# Patient Record
Sex: MALE | Race: OTHER | Employment: FULL TIME | URBAN - METROPOLITAN AREA
[De-identification: names, ages, dates, MRNs, and addresses within clinical notes are randomized per-mention and may not be internally consistent; named-entity substitution may affect disease eponyms.]

---

## 2017-10-11 ENCOUNTER — OFFICE VISIT (OUTPATIENT)
Dept: FAMILY MEDICINE CLINIC | Facility: CLINIC | Age: 37
End: 2017-10-11

## 2017-10-11 ENCOUNTER — LAB ENCOUNTER (OUTPATIENT)
Dept: LAB | Age: 37
End: 2017-10-11
Attending: FAMILY MEDICINE
Payer: COMMERCIAL

## 2017-10-11 VITALS
WEIGHT: 224 LBS | HEIGHT: 70.5 IN | SYSTOLIC BLOOD PRESSURE: 129 MMHG | BODY MASS INDEX: 31.71 KG/M2 | TEMPERATURE: 98 F | DIASTOLIC BLOOD PRESSURE: 89 MMHG | HEART RATE: 65 BPM

## 2017-10-11 DIAGNOSIS — R07.9 CHEST PAIN: Primary | ICD-10-CM

## 2017-10-11 DIAGNOSIS — R07.9 CHEST PAIN, UNSPECIFIED TYPE: ICD-10-CM

## 2017-10-11 DIAGNOSIS — K21.9 GASTROESOPHAGEAL REFLUX DISEASE WITHOUT ESOPHAGITIS: ICD-10-CM

## 2017-10-11 DIAGNOSIS — R07.9 CHEST PAIN, UNSPECIFIED TYPE: Primary | ICD-10-CM

## 2017-10-11 PROCEDURE — 36415 COLL VENOUS BLD VENIPUNCTURE: CPT

## 2017-10-11 PROCEDURE — 85025 COMPLETE CBC W/AUTO DIFF WBC: CPT

## 2017-10-11 PROCEDURE — 80061 LIPID PANEL: CPT

## 2017-10-11 PROCEDURE — 83013 H PYLORI (C-13) BREATH: CPT

## 2017-10-11 PROCEDURE — 99204 OFFICE O/P NEW MOD 45 MIN: CPT | Performed by: FAMILY MEDICINE

## 2017-10-11 PROCEDURE — 93005 ELECTROCARDIOGRAM TRACING: CPT | Performed by: FAMILY MEDICINE

## 2017-10-11 PROCEDURE — 93000 ELECTROCARDIOGRAM COMPLETE: CPT | Performed by: FAMILY MEDICINE

## 2017-10-11 PROCEDURE — 99212 OFFICE O/P EST SF 10 MIN: CPT | Performed by: FAMILY MEDICINE

## 2017-10-11 PROCEDURE — 80053 COMPREHEN METABOLIC PANEL: CPT

## 2017-10-11 RX ORDER — PANTOPRAZOLE SODIUM 40 MG/1
1 GRANULE, DELAYED RELEASE ORAL DAILY
Qty: 30 EACH | Refills: 2 | Status: SHIPPED | OUTPATIENT
Start: 2017-10-11 | End: 2017-10-24 | Stop reason: CLARIF

## 2017-10-11 NOTE — PROGRESS NOTES
10/11/2017  9:52 AM    Shane Watkins is a 40year old male. Chief complaint(s): Patient presents with:  Chest Pain: Patient c/o chest pain for the past 1 month. States that he feels pressure.    Abdominal Pain: abdominal bloating, gassy, and belching  Urin Gastrointestinal: Positive for heartburn. Negative for nausea, vomiting, abdominal pain, diarrhea and constipation. Musculoskeletal: Negative for back pain and neck pain. Skin: Negative for rash. Neurological: Negative for seizures and headaches. given include: Please, call our office with any questions or concerns. Notify Dr Zachary Lopez or the [facility] if there is a deterioration or worsening of the medical condition. Also, inform the doctor with any new symptoms or medications' side effects.

## 2017-10-13 ENCOUNTER — TELEPHONE (OUTPATIENT)
Dept: FAMILY MEDICINE CLINIC | Facility: CLINIC | Age: 37
End: 2017-10-13

## 2017-10-13 NOTE — TELEPHONE ENCOUNTER
----- Message from Cinda Roberto MD sent at 10/12/2017  6:24 PM CDT -----  Please call patient to set up a follow up appointment due to abnormal results.

## 2017-10-24 ENCOUNTER — OFFICE VISIT (OUTPATIENT)
Dept: FAMILY MEDICINE CLINIC | Facility: CLINIC | Age: 37
End: 2017-10-24

## 2017-10-24 VITALS
HEART RATE: 60 BPM | TEMPERATURE: 98 F | WEIGHT: 223 LBS | HEIGHT: 70.5 IN | SYSTOLIC BLOOD PRESSURE: 134 MMHG | DIASTOLIC BLOOD PRESSURE: 94 MMHG | BODY MASS INDEX: 31.57 KG/M2

## 2017-10-24 DIAGNOSIS — A04.8 H. PYLORI INFECTION: Primary | ICD-10-CM

## 2017-10-24 PROCEDURE — 99214 OFFICE O/P EST MOD 30 MIN: CPT | Performed by: FAMILY MEDICINE

## 2017-10-24 PROCEDURE — 99212 OFFICE O/P EST SF 10 MIN: CPT | Performed by: FAMILY MEDICINE

## 2017-10-24 RX ORDER — PANTOPRAZOLE SODIUM 40 MG/1
40 TABLET, DELAYED RELEASE ORAL
Qty: 90 TABLET | Refills: 1 | Status: SHIPPED | OUTPATIENT
Start: 2017-10-24 | End: 2018-01-15

## 2017-10-24 RX ORDER — CLARITHROMYCIN 500 MG/1
500 TABLET, COATED ORAL 2 TIMES DAILY
Qty: 28 TABLET | Refills: 0 | Status: SHIPPED | OUTPATIENT
Start: 2017-10-24 | End: 2017-11-07

## 2017-10-24 RX ORDER — PANTOPRAZOLE SODIUM 40 MG/1
TABLET, DELAYED RELEASE ORAL
COMMUNITY
Start: 2017-10-11 | End: 2017-10-24

## 2017-10-24 RX ORDER — ESOMEPRAZOLE MAGNESIUM 40 MG/1
40 CAPSULE, DELAYED RELEASE ORAL 2 TIMES DAILY
Qty: 28 CAPSULE | Refills: 0 | Status: SHIPPED | OUTPATIENT
Start: 2017-10-24 | End: 2017-11-07

## 2017-10-24 RX ORDER — AMOXICILLIN 500 MG/1
1000 CAPSULE ORAL 2 TIMES DAILY
Qty: 56 CAPSULE | Refills: 0 | Status: SHIPPED | OUTPATIENT
Start: 2017-10-24 | End: 2017-11-07

## 2017-10-24 NOTE — PROGRESS NOTES
10/24/2017  10:25 AM    Lisbeth Beach is a 40year old male. Chief complaint(s): Patient presents with:   Follow - Up: Patient here to f/u on his abdominal pain states that the medication did help alittle but pain is still present and anchilles pain has no capsule Rfl: 0   Esomeprazole Magnesium 40 MG Oral Capsule Delayed Release Take 1 capsule (40 mg total) by mouth 2 (two) times daily.  Disp: 28 capsule Rfl: 0       Allergies:    Seasonal                      ROS:   Review of Systems   Constitutional: Kathie Chairez Triglycerides 61 1 - 149 mg/dL   Non HDL Chol 120 <130 mg/dL   LDL Cholesterol 108 (H) 0 - 99 mg/dL   -COMP METABOLIC PANEL (14)   Result Value Ref Range   Glucose 114 (H) 70 - 99 mg/dL   Sodium 138 136 - 144 mmol/L   Potassium 4.2 3.3 - 5.1 mmol/L   Chl Sig: Take 1 tablet (500 mg total) by mouth 2 (two) times daily. amoxicillin 500 MG Oral Cap 56 capsule 0      Sig: Take 2 capsules (1,000 mg total) by mouth 2 (two) times daily.       Esomeprazole Magnesium 40 MG Oral Capsule Delayed Release 28 capsule

## 2017-11-09 ENCOUNTER — TELEPHONE (OUTPATIENT)
Dept: OTHER | Age: 37
End: 2017-11-09

## 2017-11-09 NOTE — TELEPHONE ENCOUNTER
Spoke with patient who gave verbal authorization for this nurse to speak with wife named Allie Booth.  Wife reports that patient was prescribed several medications for H.pylori and was suppose to take one each individually and then continue onto the next [facility]

## 2017-11-13 NOTE — TELEPHONE ENCOUNTER
Pt was inform of  message below and that he is to take the medication for 2 weeks and f/u in 4 months.  Pt verbalized understanding

## 2018-01-17 RX ORDER — PANTOPRAZOLE SODIUM 40 MG/1
40 TABLET, DELAYED RELEASE ORAL
Qty: 90 TABLET | Refills: 0 | Status: SHIPPED | OUTPATIENT
Start: 2018-01-17 | End: 2018-04-16

## 2018-01-24 ENCOUNTER — APPOINTMENT (OUTPATIENT)
Dept: GENERAL RADIOLOGY | Age: 38
End: 2018-01-24
Attending: NURSE PRACTITIONER
Payer: COMMERCIAL

## 2018-01-24 ENCOUNTER — HOSPITAL ENCOUNTER (OUTPATIENT)
Age: 38
Discharge: HOME | End: 2018-01-24
Payer: COMMERCIAL

## 2018-01-24 ENCOUNTER — NURSE TRIAGE (OUTPATIENT)
Dept: OTHER | Age: 38
End: 2018-01-24

## 2018-01-24 VITALS
BODY MASS INDEX: 30 KG/M2 | WEIGHT: 215 LBS | TEMPERATURE: 99 F | OXYGEN SATURATION: 97 % | RESPIRATION RATE: 20 BRPM | HEART RATE: 84 BPM | DIASTOLIC BLOOD PRESSURE: 85 MMHG | SYSTOLIC BLOOD PRESSURE: 127 MMHG

## 2018-01-24 DIAGNOSIS — J40 BRONCHITIS: Primary | ICD-10-CM

## 2018-01-24 PROCEDURE — 93005 ELECTROCARDIOGRAM TRACING: CPT

## 2018-01-24 PROCEDURE — 71046 X-RAY EXAM CHEST 2 VIEWS: CPT | Performed by: NURSE PRACTITIONER

## 2018-01-24 PROCEDURE — 94640 AIRWAY INHALATION TREATMENT: CPT

## 2018-01-24 PROCEDURE — 93010 ELECTROCARDIOGRAM REPORT: CPT

## 2018-01-24 PROCEDURE — 99214 OFFICE O/P EST MOD 30 MIN: CPT

## 2018-01-24 PROCEDURE — 93010 ELECTROCARDIOGRAM REPORT: CPT | Performed by: NURSE PRACTITIONER

## 2018-01-24 RX ORDER — IPRATROPIUM BROMIDE AND ALBUTEROL SULFATE 2.5; .5 MG/3ML; MG/3ML
3 SOLUTION RESPIRATORY (INHALATION) ONCE
Status: COMPLETED | OUTPATIENT
Start: 2018-01-24 | End: 2018-01-24

## 2018-01-24 RX ORDER — CODEINE PHOSPHATE AND GUAIFENESIN 10; 100 MG/5ML; MG/5ML
5 SOLUTION ORAL EVERY 6 HOURS PRN
Qty: 118 ML | Refills: 0 | Status: SHIPPED | OUTPATIENT
Start: 2018-01-24 | End: 2018-01-29

## 2018-01-24 RX ORDER — BENZONATATE 100 MG/1
100 CAPSULE ORAL 3 TIMES DAILY PRN
Qty: 30 CAPSULE | Refills: 0 | Status: SHIPPED | OUTPATIENT
Start: 2018-01-24 | End: 2018-02-06 | Stop reason: ALTCHOICE

## 2018-01-24 RX ORDER — ALBUTEROL SULFATE 90 UG/1
2 AEROSOL, METERED RESPIRATORY (INHALATION) EVERY 4 HOURS PRN
Qty: 1 INHALER | Refills: 0 | Status: SHIPPED | OUTPATIENT
Start: 2018-01-24 | End: 2018-02-06 | Stop reason: ALTCHOICE

## 2018-01-24 RX ORDER — PREDNISONE 20 MG/1
40 TABLET ORAL DAILY
Qty: 8 TABLET | Refills: 0 | Status: SHIPPED | OUTPATIENT
Start: 2018-01-24 | End: 2018-01-28

## 2018-01-24 RX ORDER — PREDNISONE 20 MG/1
40 TABLET ORAL ONCE
Status: COMPLETED | OUTPATIENT
Start: 2018-01-24 | End: 2018-01-24

## 2018-01-24 NOTE — ED INITIAL ASSESSMENT (HPI)
C/o coughing for 3 days with chest pain and tightness for 2 months Saw PMD dx with Anibal.  No fever

## 2018-01-24 NOTE — TELEPHONE ENCOUNTER
Action Requested: Summary for Provider     []  Critical Lab, Recommendations Needed  [] Need Additional Advice  [x]   FYI    []   Need Orders  [] Need Medications Sent to Pharmacy  []  Other     SUMMARY: Advised ER per protocol (d/t chest pain even when

## 2018-01-24 NOTE — ED PROVIDER NOTES
Patient presents with:  Cough/URI      HPI:     Rand Johnson is a 40year old male with a past history of H. pylori presents with coughing, chest pain and tightness since October.   Patient report he was treated for H. pylori by his primary care provider and bilaterally    MDM/Assessment/Plan:   Orders for this encounter:  Duoneb, EKG, prednisone, CXR and re-evaluate.      SPO2 97% on room air which is normal.    Xr Chest Pa + Lat Chest (cpt=71046)    Result Date: 1/24/2018  CONCLUSION: No acute cardiopulmonary cough.          Dispense:  118 mL          Refill:  0    Labs performed this visit:  No results found for this or any previous visit (from the past 10 hour(s)). Diagnosis:    ICD-10-CM    1.  Bronchitis J40        All results reviewed and discussed with

## 2018-02-06 ENCOUNTER — OFFICE VISIT (OUTPATIENT)
Dept: FAMILY MEDICINE CLINIC | Facility: CLINIC | Age: 38
End: 2018-02-06

## 2018-02-06 ENCOUNTER — LAB ENCOUNTER (OUTPATIENT)
Dept: LAB | Age: 38
End: 2018-02-06
Attending: FAMILY MEDICINE
Payer: COMMERCIAL

## 2018-02-06 VITALS
SYSTOLIC BLOOD PRESSURE: 130 MMHG | WEIGHT: 224 LBS | TEMPERATURE: 98 F | DIASTOLIC BLOOD PRESSURE: 94 MMHG | HEART RATE: 72 BPM | BODY MASS INDEX: 31.71 KG/M2 | HEIGHT: 70.5 IN

## 2018-02-06 DIAGNOSIS — K21.9 ESOPHAGEAL REFLUX: ICD-10-CM

## 2018-02-06 DIAGNOSIS — A04.8 H. PYLORI INFECTION: Primary | ICD-10-CM

## 2018-02-06 DIAGNOSIS — M94.0 COSTOCHONDRITIS: ICD-10-CM

## 2018-02-06 DIAGNOSIS — K21.9 GASTROESOPHAGEAL REFLUX DISEASE WITHOUT ESOPHAGITIS: ICD-10-CM

## 2018-02-06 PROCEDURE — 83013 H PYLORI (C-13) BREATH: CPT

## 2018-02-06 PROCEDURE — 99214 OFFICE O/P EST MOD 30 MIN: CPT | Performed by: FAMILY MEDICINE

## 2018-02-06 PROCEDURE — 99212 OFFICE O/P EST SF 10 MIN: CPT | Performed by: FAMILY MEDICINE

## 2018-02-06 NOTE — PROGRESS NOTES
2/6/2018  11:42 AM    Deedee Raphael is a 40year old male. Chief complaint(s): Patient presents with: Follow - Up: Patient was seen in urgent care for Bronchitis and f/u on H Pylori    HPI:     Deedee Raphael primary complaint is regarding GERD/ H. pylori. Respiratory: Negative for cough, shortness of breath and wheezing. Cardiovascular: Positive for chest pain. Negative for palpitations. Gastrointestinal: Positive for heartburn.  Negative for nausea, vomiting, abdominal pain, diarrhea and constipation Dictated by (CST): Rocio Roblero MD on 1/24/2018 at 10:23     Approved by (CST): Rocio Roblero MD on 1/24/2018 at 10:24          CONCLUSION: No acute cardiopulmonary pathology.             ASSESSMENT/PLAN:   Assessment   H. pylori infection  (pr

## 2018-02-08 LAB — H. PYLORI BREATH TEST: NEGATIVE

## 2018-03-01 ENCOUNTER — LAB ENCOUNTER (OUTPATIENT)
Dept: LAB | Age: 38
End: 2018-03-01
Attending: FAMILY MEDICINE
Payer: COMMERCIAL

## 2018-03-01 ENCOUNTER — OFFICE VISIT (OUTPATIENT)
Dept: FAMILY MEDICINE CLINIC | Facility: CLINIC | Age: 38
End: 2018-03-01

## 2018-03-01 VITALS
HEART RATE: 73 BPM | SYSTOLIC BLOOD PRESSURE: 131 MMHG | WEIGHT: 226 LBS | TEMPERATURE: 99 F | BODY MASS INDEX: 31.99 KG/M2 | DIASTOLIC BLOOD PRESSURE: 90 MMHG | HEIGHT: 70.5 IN

## 2018-03-01 DIAGNOSIS — Z00.00 PHYSICAL EXAM: Primary | ICD-10-CM

## 2018-03-01 DIAGNOSIS — Z00.00 PHYSICAL EXAM: ICD-10-CM

## 2018-03-01 LAB
ALBUMIN SERPL BCP-MCNC: 4.1 G/DL (ref 3.5–4.8)
ALBUMIN/GLOB SERPL: 2 {RATIO} (ref 1–2)
ALP SERPL-CCNC: 52 U/L (ref 32–100)
ALT SERPL-CCNC: 66 U/L (ref 17–63)
ANION GAP SERPL CALC-SCNC: 6 MMOL/L (ref 0–18)
AST SERPL-CCNC: 42 U/L (ref 15–41)
BACTERIA UR QL AUTO: NEGATIVE /HPF
BASOPHILS # BLD: 0.1 K/UL (ref 0–0.2)
BASOPHILS NFR BLD: 1 %
BILIRUB SERPL-MCNC: 0.7 MG/DL (ref 0.3–1.2)
BILIRUB UR QL: NEGATIVE
BUN SERPL-MCNC: 12 MG/DL (ref 8–20)
BUN/CREAT SERPL: 13.8 (ref 10–20)
CALCIUM SERPL-MCNC: 8.9 MG/DL (ref 8.5–10.5)
CHLORIDE SERPL-SCNC: 107 MMOL/L (ref 95–110)
CHOLEST SERPL-MCNC: 178 MG/DL (ref 110–200)
CLARITY UR: CLEAR
CO2 SERPL-SCNC: 26 MMOL/L (ref 22–32)
COLOR UR: YELLOW
CREAT SERPL-MCNC: 0.87 MG/DL (ref 0.5–1.5)
EOSINOPHIL # BLD: 0.2 K/UL (ref 0–0.7)
EOSINOPHIL NFR BLD: 2 %
ERYTHROCYTE [DISTWIDTH] IN BLOOD BY AUTOMATED COUNT: 13.7 % (ref 11–15)
GLOBULIN PLAS-MCNC: 2.1 G/DL (ref 2.5–3.7)
GLUCOSE SERPL-MCNC: 128 MG/DL (ref 70–99)
GLUCOSE UR-MCNC: NEGATIVE MG/DL
HBA1C MFR BLD: 6 % (ref 4–6)
HCT VFR BLD AUTO: 42.4 % (ref 41–52)
HDLC SERPL-MCNC: 46 MG/DL
HGB BLD-MCNC: 14.3 G/DL (ref 13.5–17.5)
HGB UR QL STRIP.AUTO: NEGATIVE
KETONES UR-MCNC: NEGATIVE MG/DL
LDLC SERPL CALC-MCNC: 112 MG/DL (ref 0–99)
LEUKOCYTE ESTERASE UR QL STRIP.AUTO: NEGATIVE
LYMPHOCYTES # BLD: 1.9 K/UL (ref 1–4)
LYMPHOCYTES NFR BLD: 28 %
MCH RBC QN AUTO: 28.7 PG (ref 27–32)
MCHC RBC AUTO-ENTMCNC: 33.7 G/DL (ref 32–37)
MCV RBC AUTO: 85.1 FL (ref 80–100)
MONOCYTES # BLD: 0.5 K/UL (ref 0–1)
MONOCYTES NFR BLD: 7 %
NEUTROPHILS # BLD AUTO: 4.3 K/UL (ref 1.8–7.7)
NEUTROPHILS NFR BLD: 63 %
NITRITE UR QL STRIP.AUTO: NEGATIVE
NONHDLC SERPL-MCNC: 132 MG/DL
OSMOLALITY UR CALC.SUM OF ELEC: 289 MOSM/KG (ref 275–295)
PATIENT FASTING: YES
PH UR: 6 [PH] (ref 5–8)
PLATELET # BLD AUTO: 233 K/UL (ref 140–400)
PMV BLD AUTO: 8.6 FL (ref 7.4–10.3)
POTASSIUM SERPL-SCNC: 3.9 MMOL/L (ref 3.3–5.1)
PROT SERPL-MCNC: 6.2 G/DL (ref 5.9–8.4)
PROT UR-MCNC: NEGATIVE MG/DL
RBC # BLD AUTO: 4.97 M/UL (ref 4.5–5.9)
RBC #/AREA URNS AUTO: 1 /HPF
SODIUM SERPL-SCNC: 139 MMOL/L (ref 136–144)
SP GR UR STRIP: 1.03 (ref 1–1.03)
TRIGL SERPL-MCNC: 100 MG/DL (ref 1–149)
TSH SERPL-ACNC: 1.27 UIU/ML (ref 0.45–5.33)
UROBILINOGEN UR STRIP-ACNC: <2
VIT C UR-MCNC: NEGATIVE MG/DL
WBC # BLD AUTO: 7 K/UL (ref 4–11)
WBC #/AREA URNS AUTO: 1 /HPF

## 2018-03-01 PROCEDURE — 90471 IMMUNIZATION ADMIN: CPT | Performed by: FAMILY MEDICINE

## 2018-03-01 PROCEDURE — 81001 URINALYSIS AUTO W/SCOPE: CPT | Performed by: FAMILY MEDICINE

## 2018-03-01 PROCEDURE — 36415 COLL VENOUS BLD VENIPUNCTURE: CPT

## 2018-03-01 PROCEDURE — 90715 TDAP VACCINE 7 YRS/> IM: CPT | Performed by: FAMILY MEDICINE

## 2018-03-01 PROCEDURE — 99395 PREV VISIT EST AGE 18-39: CPT | Performed by: FAMILY MEDICINE

## 2018-03-01 PROCEDURE — 81015 MICROSCOPIC EXAM OF URINE: CPT | Performed by: FAMILY MEDICINE

## 2018-03-01 PROCEDURE — 82306 VITAMIN D 25 HYDROXY: CPT | Performed by: FAMILY MEDICINE

## 2018-03-01 PROCEDURE — 80061 LIPID PANEL: CPT

## 2018-03-01 PROCEDURE — 84443 ASSAY THYROID STIM HORMONE: CPT

## 2018-03-01 PROCEDURE — 83036 HEMOGLOBIN GLYCOSYLATED A1C: CPT

## 2018-03-01 PROCEDURE — 80053 COMPREHEN METABOLIC PANEL: CPT

## 2018-03-01 PROCEDURE — 93005 ELECTROCARDIOGRAM TRACING: CPT

## 2018-03-01 PROCEDURE — 93010 ELECTROCARDIOGRAM REPORT: CPT | Performed by: FAMILY MEDICINE

## 2018-03-01 PROCEDURE — 85025 COMPLETE CBC W/AUTO DIFF WBC: CPT

## 2018-03-01 NOTE — PROGRESS NOTES
3/1/2018  10:00 AM    Makenna Casillas is a 40year old male. Chief complaint(s): Patient presents with:  Routine Physical    HPI:     Makenna Casillas primary complaint is regarding CPE.      Makenna Casillas is a 40year old male is here for routine periodic health s intolerance, polydipsia and polyuria. Genitourinary: Negative for bladder incontinence, urgency, frequency, hematuria, decreased urine volume, discharge, difficulty urinating, genital sores, testicular pain, nocturia and sexual dysfunction.    Musculoskel and no tenderness. Musculoskeletal:   Spinal exam without scoliosis. Lymphadenopathy:   Negative for cervical, axillary or inguinal lymphadenopathy. There are no lower extremities edema or varicose veins. Neurological: No sensory deficit.    Reflex staying active; attempt to keep a schedule that includes an adequate sleep and physical exercise / activities Patient educated on doing regular self testicular exam. Patient was educated on sexual transmitted disease.  Best to abstain from sexual intercours

## 2018-03-02 LAB — 25(OH)D3 SERPL-MCNC: 22.8 NG/ML

## 2018-03-03 RX ORDER — ERGOCALCIFEROL 1.25 MG/1
50000 CAPSULE ORAL WEEKLY
Qty: 12 CAPSULE | Refills: 4 | Status: SHIPPED | OUTPATIENT
Start: 2018-03-03 | End: 2018-04-02

## 2018-04-16 RX ORDER — PANTOPRAZOLE SODIUM 40 MG/1
40 TABLET, DELAYED RELEASE ORAL
Qty: 90 TABLET | Refills: 0 | Status: SHIPPED | OUTPATIENT
Start: 2018-04-16 | End: 2018-09-27

## 2018-05-14 ENCOUNTER — OFFICE VISIT (OUTPATIENT)
Dept: FAMILY MEDICINE CLINIC | Facility: CLINIC | Age: 38
End: 2018-05-14

## 2018-05-14 VITALS
TEMPERATURE: 99 F | BODY MASS INDEX: 31 KG/M2 | DIASTOLIC BLOOD PRESSURE: 85 MMHG | HEART RATE: 73 BPM | WEIGHT: 222 LBS | SYSTOLIC BLOOD PRESSURE: 126 MMHG

## 2018-05-14 DIAGNOSIS — F41.9 ANXIETY: ICD-10-CM

## 2018-05-14 DIAGNOSIS — R35.89 POLYURIA: Primary | ICD-10-CM

## 2018-05-14 PROCEDURE — 99212 OFFICE O/P EST SF 10 MIN: CPT | Performed by: FAMILY MEDICINE

## 2018-05-14 PROCEDURE — 99214 OFFICE O/P EST MOD 30 MIN: CPT | Performed by: FAMILY MEDICINE

## 2018-05-14 RX ORDER — ALPRAZOLAM 0.25 MG/1
0.25 TABLET ORAL NIGHTLY PRN
Qty: 30 TABLET | Refills: 0 | Status: SHIPPED | OUTPATIENT
Start: 2018-05-14 | End: 2018-06-05

## 2018-05-14 NOTE — PROGRESS NOTES
5/14/2018  1:54 PM    Amelie Fiore is a 45year old male.     Chief complaint(s): Patient presents with:  Urinary Frequency: c/o frequent urination  Anxiety: pt has been very stressed lately and feeling chest pain/ pressure    HPI:     Amelie Fiore primary co (Active prior to today's visit):    Current Outpatient Prescriptions:  ALPRAZolam 0.25 MG Oral Tab Take 1 tablet (0.25 mg total) by mouth nightly as needed for Sleep.  Disp: 30 tablet Rfl: 0   PANTOPRAZOLE SODIUM 40 MG Oral Tab EC TAKE 1 TABLET (40 MG TOTAL penis normal.   Skin: No rash noted. Psychiatric: His mood appears anxious. Appropriate affect and demeanor.        LABORATORY RESULTS:   No results found for: Gene Moons     Results for orders placed or performe indicated    -CBC W/ DIFFERENTIAL   Result Value Ref Range   WBC 7.0 4.0 - 11.0 K/UL   RBC 4.97 4.50 - 5.90 M/UL   HGB 14.3 13.5 - 17.5 g/dL   HCT 42.4 41.0 - 52.0 %   MCV 85.1 80.0 - 100.0 fL   MCH 28.7 27.0 - 32.0 pg   MCHC 33.7 32.0 - 37.0 g/dl   RDW 13

## 2018-06-05 NOTE — TELEPHONE ENCOUNTER
From: Cheryl Ganser  Sent: 6/5/2018 1:04 PM CDT  Subject: Medication Renewal Request    Cheryl Ganser would like a refill of the following medications:     ALPRAZolam 0.25 MG Oral Tab Ana Walter MD]    Preferred pharmacy: [facility][id number] Ricco Obrien,

## 2018-06-06 ENCOUNTER — PATIENT MESSAGE (OUTPATIENT)
Dept: FAMILY MEDICINE CLINIC | Facility: CLINIC | Age: 38
End: 2018-06-06

## 2018-06-06 RX ORDER — ALPRAZOLAM 0.25 MG/1
0.25 TABLET ORAL NIGHTLY PRN
Qty: 30 TABLET | Refills: 0
Start: 2018-06-06 | End: 2018-06-06

## 2018-06-06 RX ORDER — ALPRAZOLAM 0.25 MG/1
0.25 TABLET ORAL NIGHTLY PRN
Qty: 30 TABLET | Refills: 0 | Status: SHIPPED | OUTPATIENT
Start: 2018-06-06 | End: 2018-09-27

## 2018-06-06 NOTE — TELEPHONE ENCOUNTER
From: Divina Rodriguez  To: Wilfred Cartagena MD  Sent: 6/6/2018 9:59 AM CDT  Subject: Prescription Question    I just had a prescription re fill approved by dr Og Oscar .can my wife  the paperwork at the office ?

## 2018-06-06 NOTE — TELEPHONE ENCOUNTER
LOV: 5-14-18 Last Rx: 5-14-18    Please advise in regards to refill request. Thank You    Please respond to pool: ADELA ANO LPN/TONY

## 2018-09-27 ENCOUNTER — OFFICE VISIT (OUTPATIENT)
Dept: FAMILY MEDICINE CLINIC | Facility: CLINIC | Age: 38
End: 2018-09-27
Payer: COMMERCIAL

## 2018-09-27 VITALS
WEIGHT: 228 LBS | HEART RATE: 63 BPM | TEMPERATURE: 98 F | SYSTOLIC BLOOD PRESSURE: 135 MMHG | BODY MASS INDEX: 32 KG/M2 | DIASTOLIC BLOOD PRESSURE: 92 MMHG

## 2018-09-27 DIAGNOSIS — B34.9 ACUTE VIRAL SYNDROME: Primary | ICD-10-CM

## 2018-09-27 PROCEDURE — 99212 OFFICE O/P EST SF 10 MIN: CPT | Performed by: FAMILY MEDICINE

## 2018-09-27 PROCEDURE — 99213 OFFICE O/P EST LOW 20 MIN: CPT | Performed by: FAMILY MEDICINE

## 2018-09-27 RX ORDER — FLUTICASONE PROPIONATE 50 MCG
2 SPRAY, SUSPENSION (ML) NASAL DAILY
Qty: 1 INHALER | Refills: 3 | Status: SHIPPED | OUTPATIENT
Start: 2018-09-27 | End: 2019-11-07 | Stop reason: ALTCHOICE

## 2018-09-27 NOTE — PROGRESS NOTES
2018 10:35 AM    Roger Sterling, : 1980  Patient presents with:  Sinus Problem: C/O sinus pressure, congestion, fatigue, sneezing and itchy throat.     HPI:     Roger Sterling is a 45year old male who presents for evaluation of a chief complaint of Number of children: Not on file      Years of education: Not on file      Highest education level: Not on file    Social Needs      Financial resource strain: Not on file      Food insecurity - worry: Not on file      Food insecurity - inability: Not on fi hour(s)). MDM/Assessment/Plan:   Assessment and Plan:    Diagnosis:    ICD-10-CM    1. Acute viral syndrome B34.9        MEDICATIONS: •  Pseudoephedrine-Ibuprofen  MG Oral Tab, Take 1 tablet by mouth every 6 (six) hours as needed.  Take 1 tablet po

## 2018-10-30 ENCOUNTER — PATIENT MESSAGE (OUTPATIENT)
Dept: FAMILY MEDICINE CLINIC | Facility: CLINIC | Age: 38
End: 2018-10-30

## 2018-10-30 RX ORDER — ALPRAZOLAM 0.25 MG/1
0.25 TABLET ORAL NIGHTLY PRN
Qty: 30 TABLET | Refills: 0 | Status: SHIPPED
Start: 2018-10-30 | End: 2019-09-19

## 2018-10-30 NOTE — TELEPHONE ENCOUNTER
From: Andrés Cordero  To: Fior Nye MD  Sent: 10/30/2018 11:46 AM CDT  Subject: Prescription Question    Hello,    I’m sending a message Requesting a refill of alpazolam 0.25 mg.    I had been prescribed this medication as needed for sleep.  [employer]

## 2018-10-30 NOTE — TELEPHONE ENCOUNTER
Requested Prescriptions     Pending Prescriptions Disp Refills   • ALPRAZolam 0.25 MG Oral Tab 30 tablet 0     Sig: Take 1 tablet (0.25 mg total) by mouth nightly as needed for Sleep.        Last Office Visit with PCP: 9/27/2018  Last Blood Pressures:  BP R

## 2018-10-30 NOTE — TELEPHONE ENCOUNTER
From: Kalpana Mention  To: Sue Pretty MD  Sent: 10/30/2018 11:46 AM CDT  Subject: Prescription Question    Hello,  I’m sending a message Requesting  a refill of alpazolam 0.25 mg.  I had been prescribed this medication  as needed for sleep.  [employer]

## 2018-10-31 RX ORDER — ALPRAZOLAM 0.25 MG/1
TABLET ORAL
Qty: 30 TABLET | Refills: 0 | OUTPATIENT
Start: 2018-10-31

## 2019-09-19 ENCOUNTER — OFFICE VISIT (OUTPATIENT)
Dept: FAMILY MEDICINE CLINIC | Facility: CLINIC | Age: 39
End: 2019-09-19
Payer: COMMERCIAL

## 2019-09-19 VITALS
BODY MASS INDEX: 30.16 KG/M2 | HEART RATE: 65 BPM | DIASTOLIC BLOOD PRESSURE: 90 MMHG | SYSTOLIC BLOOD PRESSURE: 132 MMHG | HEIGHT: 70.5 IN | WEIGHT: 213 LBS | TEMPERATURE: 98 F

## 2019-09-19 DIAGNOSIS — B34.9 ACUTE VIRAL SYNDROME: Primary | ICD-10-CM

## 2019-09-19 DIAGNOSIS — J02.9 SORE THROAT: ICD-10-CM

## 2019-09-19 LAB
CONTROL LINE PRESENT WITH A CLEAR BACKGROUND (YES/NO): YES YES/NO
KIT EXPIRATION DATE: NORMAL DATE
KIT LOT #: NORMAL NUMERIC
STREP GRP A CUL-SCR: NEGATIVE

## 2019-09-19 PROCEDURE — 99213 OFFICE O/P EST LOW 20 MIN: CPT | Performed by: FAMILY MEDICINE

## 2019-09-19 PROCEDURE — 87880 STREP A ASSAY W/OPTIC: CPT | Performed by: FAMILY MEDICINE

## 2019-09-19 RX ORDER — ECHINACEA PURPUREA EXTRACT 125 MG
1 TABLET ORAL 4 TIMES DAILY PRN
Qty: 50 ML | Refills: 1 | Status: SHIPPED | OUTPATIENT
Start: 2019-09-19 | End: 2019-11-07 | Stop reason: ALTCHOICE

## 2019-09-19 RX ORDER — ALPRAZOLAM 0.25 MG/1
0.25 TABLET ORAL NIGHTLY PRN
Qty: 30 TABLET | Refills: 0 | Status: SHIPPED | OUTPATIENT
Start: 2019-09-19 | End: 2019-11-07

## 2019-09-19 NOTE — PROGRESS NOTES
2019 11:54 AM    Ruddy Sahu, : 1980  Patient presents with:  Flu: congestion, sore throat, slighly fever, mostly dry cough but sometimes yellow or green phealm x 4 days    HPI:     Ruddy Sahu is a 44year old male who presents for evaluation file.    Social History: Social History    Socioeconomic History      Marital status:       Spouse name: Not on file      Number of children: Not on file      Years of education: Not on file      Highest education level: Not on file    Occupational Physical Exam:     Physical Examination:    /90   Pulse 65   Temp 97.8 °F (36.6 °C)   Ht 5' 10.5\" (1.791 m)   Wt 213 lb (96.6 kg)   BMI 30.13 kg/m²     GENERAL: well developed, well nourished, well hydrated, no distress  SKIN: good skin turgor, given include: Please, call our office with any questions or concerns. Notify the doctor if there is a deterioration or worsening of the medical condition. Also, inform the doctor with any new symptoms or medications' side effects. .    FOLLOW-UP:  Instruct

## 2019-11-07 NOTE — PROGRESS NOTES
11/7/2019  2:01 PM    Roger Sterling is a 44year old male. Chief complaint(s): Patient presents with:   Insomnia: recently lost his father, is staying at his mothers which is a little difficult     HPI:     Roger Sterling primary complaint is regarding as abo for sleep disturbance. Negative for depressed mood. The patient is nervous/anxious. PHYSICAL EXAM:   Physical Exam    Constitutional: He appears well-developed and well-nourished.    /88 (BP Location: Right arm, Patient Position: Sitting, Cuff

## 2020-01-02 NOTE — PROGRESS NOTES
1/2/2020  1:43 PM    Jackie Powell is a 44year old male. Chief complaint(s): Patient presents with: Anxiety: follow up/medication refill  Insomnia: follow up/medication refill    HPI:     Jackie Powell primary complaint is regarding as above.      Rajeev Heath Negative for headaches. Psychiatric/Behavioral: Positive for sleep disturbance. Negative for depressed mood. The patient is nervous/anxious. PHYSICAL EXAM:   Physical Exam    Constitutional: He appears well-developed and well-nourished.    /80

## 2020-11-25 ENCOUNTER — TELEMEDICINE (OUTPATIENT)
Dept: FAMILY MEDICINE CLINIC | Facility: CLINIC | Age: 40
End: 2020-11-25

## 2020-11-25 DIAGNOSIS — B34.9 ACUTE VIRAL SYNDROME: Primary | ICD-10-CM

## 2020-11-25 DIAGNOSIS — Z20.822 EXPOSURE TO COVID-19 VIRUS: ICD-10-CM

## 2020-11-25 PROCEDURE — 99213 OFFICE O/P EST LOW 20 MIN: CPT | Performed by: FAMILY MEDICINE

## 2020-11-25 NOTE — PROGRESS NOTES
Virtual Video Check-In    Darlin Mccarty verbally consents to a Virtual/Video Check-In visit on 11/25/20. Patient has been referred to the [facility] website at [email or web address] to review the yearly Consent to Treat document.     Patient understands and acc

## 2020-12-08 ENCOUNTER — TELEMEDICINE (OUTPATIENT)
Dept: FAMILY MEDICINE CLINIC | Facility: CLINIC | Age: 40
End: 2020-12-08

## 2020-12-08 DIAGNOSIS — U07.1 COVID-19 VIRUS INFECTION: Primary | ICD-10-CM

## 2020-12-08 PROCEDURE — 99213 OFFICE O/P EST LOW 20 MIN: CPT | Performed by: FAMILY MEDICINE

## 2020-12-08 NOTE — PROGRESS NOTES
Virtual Telephone Check-In    Andreia Schuler verbally consents to a Virtual/Telephone Check-In visit on 12/08/20. Patient has been referred to the Health system website at [email or web address] to review the yearly Consent to Treat document.     Patient understands

## 2022-01-04 ENCOUNTER — OFFICE VISIT (OUTPATIENT)
Dept: FAMILY MEDICINE CLINIC | Facility: CLINIC | Age: 42
End: 2022-01-04
Payer: COMMERCIAL

## 2022-01-04 VITALS
SYSTOLIC BLOOD PRESSURE: 134 MMHG | DIASTOLIC BLOOD PRESSURE: 86 MMHG | HEIGHT: 70.5 IN | WEIGHT: 227 LBS | BODY MASS INDEX: 32.14 KG/M2 | HEART RATE: 64 BPM

## 2022-01-04 DIAGNOSIS — K21.9 GASTROESOPHAGEAL REFLUX DISEASE WITHOUT ESOPHAGITIS: Primary | ICD-10-CM

## 2022-01-04 PROCEDURE — 3079F DIAST BP 80-89 MM HG: CPT | Performed by: FAMILY MEDICINE

## 2022-01-04 PROCEDURE — 99213 OFFICE O/P EST LOW 20 MIN: CPT | Performed by: FAMILY MEDICINE

## 2022-01-04 PROCEDURE — 3075F SYST BP GE 130 - 139MM HG: CPT | Performed by: FAMILY MEDICINE

## 2022-01-04 PROCEDURE — 3008F BODY MASS INDEX DOCD: CPT | Performed by: FAMILY MEDICINE

## 2022-01-04 RX ORDER — FAMOTIDINE 40 MG/1
40 TABLET, FILM COATED ORAL DAILY
Qty: 30 TABLET | Refills: 1 | Status: SHIPPED | OUTPATIENT
Start: 2022-01-04 | End: 2022-01-18

## 2022-01-04 NOTE — PROGRESS NOTES
1/4/2022  1:13 PM    Delia Fernandez is a 39year old male.     Chief complaint(s): Patient presents with:  Throat Problem: c/o something stuck in his throat and discomfort   Belching: c/o burping alot with just about anything     HPI:     Delia Fernandez primary c chest pain. Gastrointestinal: Negative for abdominal pain, constipation, diarrhea, nausea and vomiting. Heartburn   Musculoskeletal: Negative for myalgias. Skin: Negative for rash. Neurological: Negative for dizziness, weakness and headaches. or concerns that may come up in the near future. Notify Dr Walker Monday or the [facility] if there is a deterioration or worsening of the medical condition. Also, inform the doctor with any new symptoms or medications' side effects. Weight loss plan.

## 2022-01-18 ENCOUNTER — OFFICE VISIT (OUTPATIENT)
Dept: FAMILY MEDICINE CLINIC | Facility: CLINIC | Age: 42
End: 2022-01-18
Payer: COMMERCIAL

## 2022-01-18 VITALS
SYSTOLIC BLOOD PRESSURE: 165 MMHG | HEART RATE: 73 BPM | HEIGHT: 70.5 IN | WEIGHT: 225 LBS | DIASTOLIC BLOOD PRESSURE: 103 MMHG | BODY MASS INDEX: 31.85 KG/M2

## 2022-01-18 DIAGNOSIS — Z00.00 PHYSICAL EXAM: Primary | ICD-10-CM

## 2022-01-18 PROCEDURE — 3077F SYST BP >= 140 MM HG: CPT | Performed by: FAMILY MEDICINE

## 2022-01-18 PROCEDURE — 99396 PREV VISIT EST AGE 40-64: CPT | Performed by: FAMILY MEDICINE

## 2022-01-18 PROCEDURE — 3008F BODY MASS INDEX DOCD: CPT | Performed by: FAMILY MEDICINE

## 2022-01-18 PROCEDURE — 3080F DIAST BP >= 90 MM HG: CPT | Performed by: FAMILY MEDICINE

## 2022-01-18 NOTE — PROGRESS NOTES
1/18/2022  4:01 PM    Enid De León is a 39year old male. Chief complaint(s): Patient presents with:  Routine Physical    HPI:     Enid De León primary complaint is regarding CPE.      Enid De León is a 39year old male is here for routine periodic health s Endocrine: Negative for polydipsia and polyuria. Genitourinary: Negative for decreased urine volume, frequency and hematuria. No nocturia   Musculoskeletal: Negative for arthralgias. Skin: Negative for rash.    Neurological: Negative for dizzin deficit present. Mental Status: He is alert. Deep Tendon Reflexes:      Reflex Scores:       Patellar reflexes are 2+ on the right side and 2+ on the left side.   Psychiatric:         Attention and Perception: Attention normal.         Mood and Af pregnancy. FOLLOW-UP: Schedule a follow-up visit in 12 months.          Orders This Visit:  Orders Placed This Encounter      CBC With Differential With Platelet      Comp Metabolic Panel (14)      Hemoglobin A1C      Lipid Panel      TSH W Reflex To [place]

## 2022-01-22 ENCOUNTER — LAB ENCOUNTER (OUTPATIENT)
Dept: LAB | Age: 42
End: 2022-01-22
Attending: FAMILY MEDICINE
Payer: COMMERCIAL

## 2022-01-22 DIAGNOSIS — Z00.00 PHYSICAL EXAM: ICD-10-CM

## 2022-01-22 LAB
ALBUMIN SERPL-MCNC: 4.4 G/DL (ref 3.4–5)
ALBUMIN/GLOB SERPL: 1.4 {RATIO} (ref 1–2)
ALP LIVER SERPL-CCNC: 69 U/L
ALT SERPL-CCNC: 46 U/L
ANION GAP SERPL CALC-SCNC: 5 MMOL/L (ref 0–18)
AST SERPL-CCNC: 23 U/L (ref 15–37)
BASOPHILS # BLD AUTO: 0.05 X10(3) UL (ref 0–0.2)
BASOPHILS NFR BLD AUTO: 0.6 %
BILIRUB SERPL-MCNC: 0.8 MG/DL (ref 0.1–2)
BILIRUB UR QL: NEGATIVE
BUN BLD-MCNC: 12 MG/DL (ref 7–18)
BUN/CREAT SERPL: 12 (ref 10–20)
CALCIUM BLD-MCNC: 9.4 MG/DL (ref 8.5–10.1)
CHLORIDE SERPL-SCNC: 106 MMOL/L (ref 98–112)
CHOLEST SERPL-MCNC: 220 MG/DL (ref ?–200)
CLARITY UR: CLEAR
CO2 SERPL-SCNC: 26 MMOL/L (ref 21–32)
COLOR UR: YELLOW
CREAT BLD-MCNC: 1 MG/DL
DEPRECATED RDW RBC AUTO: 39.1 FL (ref 35.1–46.3)
EOSINOPHIL # BLD AUTO: 0.11 X10(3) UL (ref 0–0.7)
EOSINOPHIL NFR BLD AUTO: 1.4 %
ERYTHROCYTE [DISTWIDTH] IN BLOOD BY AUTOMATED COUNT: 12.4 % (ref 11–15)
EST. AVERAGE GLUCOSE BLD GHB EST-MCNC: 126 MG/DL (ref 68–126)
FASTING PATIENT LIPID ANSWER: YES
FASTING STATUS PATIENT QL REPORTED: YES
GLOBULIN PLAS-MCNC: 3.1 G/DL (ref 2.8–4.4)
GLUCOSE BLD-MCNC: 122 MG/DL (ref 70–99)
GLUCOSE UR-MCNC: NEGATIVE MG/DL
HBA1C MFR BLD: 6 % (ref ?–5.7)
HCT VFR BLD AUTO: 46.9 %
HDLC SERPL-MCNC: 49 MG/DL (ref 40–59)
HGB BLD-MCNC: 15.7 G/DL
HGB UR QL STRIP.AUTO: NEGATIVE
IMM GRANULOCYTES # BLD AUTO: 0.01 X10(3) UL (ref 0–1)
IMM GRANULOCYTES NFR BLD: 0.1 %
KETONES UR-MCNC: NEGATIVE MG/DL
LDLC SERPL CALC-MCNC: 151 MG/DL (ref ?–100)
LEUKOCYTE ESTERASE UR QL STRIP.AUTO: NEGATIVE
LYMPHOCYTES # BLD AUTO: 2.4 X10(3) UL (ref 1–4)
LYMPHOCYTES NFR BLD AUTO: 29.8 %
MCH RBC QN AUTO: 29 PG (ref 26–34)
MCHC RBC AUTO-ENTMCNC: 33.5 G/DL (ref 31–37)
MCV RBC AUTO: 86.5 FL
MONOCYTES # BLD AUTO: 0.56 X10(3) UL (ref 0.1–1)
MONOCYTES NFR BLD AUTO: 6.9 %
NEUTROPHILS # BLD AUTO: 4.93 X10 (3) UL (ref 1.5–7.7)
NEUTROPHILS # BLD AUTO: 4.93 X10(3) UL (ref 1.5–7.7)
NEUTROPHILS NFR BLD AUTO: 61.2 %
NITRITE UR QL STRIP.AUTO: NEGATIVE
NONHDLC SERPL-MCNC: 171 MG/DL (ref ?–130)
OSMOLALITY SERPL CALC.SUM OF ELEC: 285 MOSM/KG (ref 275–295)
PH UR: 6 [PH] (ref 5–8)
PLATELET # BLD AUTO: 319 10(3)UL (ref 150–450)
POTASSIUM SERPL-SCNC: 3.7 MMOL/L (ref 3.5–5.1)
PROT SERPL-MCNC: 7.5 G/DL (ref 6.4–8.2)
PROT UR-MCNC: NEGATIVE MG/DL
RBC # BLD AUTO: 5.42 X10(6)UL
SODIUM SERPL-SCNC: 137 MMOL/L (ref 136–145)
SP GR UR STRIP: 1.02 (ref 1–1.03)
TRIGL SERPL-MCNC: 109 MG/DL (ref 30–149)
TSI SER-ACNC: 1.3 MIU/ML (ref 0.36–3.74)
UROBILINOGEN UR STRIP-ACNC: <2
VIT D+METAB SERPL-MCNC: 16.9 NG/ML (ref 30–100)
VLDLC SERPL CALC-MCNC: 21 MG/DL (ref 0–30)
WBC # BLD AUTO: 8.1 X10(3) UL (ref 4–11)

## 2022-01-22 PROCEDURE — 82306 VITAMIN D 25 HYDROXY: CPT

## 2022-01-22 PROCEDURE — 80053 COMPREHEN METABOLIC PANEL: CPT

## 2022-01-22 PROCEDURE — 80061 LIPID PANEL: CPT

## 2022-01-22 PROCEDURE — 85025 COMPLETE CBC W/AUTO DIFF WBC: CPT

## 2022-01-22 PROCEDURE — 81001 URINALYSIS AUTO W/SCOPE: CPT

## 2022-01-22 PROCEDURE — 83036 HEMOGLOBIN GLYCOSYLATED A1C: CPT

## 2022-01-22 PROCEDURE — 84443 ASSAY THYROID STIM HORMONE: CPT

## 2022-01-22 PROCEDURE — 36415 COLL VENOUS BLD VENIPUNCTURE: CPT

## 2022-01-22 RX ORDER — ERGOCALCIFEROL 1.25 MG/1
50000 CAPSULE ORAL WEEKLY
Qty: 12 CAPSULE | Refills: 4 | Status: SHIPPED | OUTPATIENT
Start: 2022-01-22 | End: 2022-02-21

## 2022-01-24 ENCOUNTER — PATIENT MESSAGE (OUTPATIENT)
Dept: OTHER | Age: 42
End: 2022-01-24

## 2022-08-29 ENCOUNTER — MED REC SCAN ONLY (OUTPATIENT)
Dept: FAMILY MEDICINE CLINIC | Facility: CLINIC | Age: 42
End: 2022-08-29

## 2023-05-03 ENCOUNTER — LAB ENCOUNTER (OUTPATIENT)
Dept: LAB | Age: 43
End: 2023-05-03
Attending: FAMILY MEDICINE
Payer: COMMERCIAL

## 2023-05-03 ENCOUNTER — OFFICE VISIT (OUTPATIENT)
Dept: FAMILY MEDICINE CLINIC | Facility: CLINIC | Age: 43
End: 2023-05-03

## 2023-05-03 VITALS
HEIGHT: 70.5 IN | DIASTOLIC BLOOD PRESSURE: 88 MMHG | WEIGHT: 223 LBS | HEART RATE: 65 BPM | SYSTOLIC BLOOD PRESSURE: 128 MMHG | BODY MASS INDEX: 31.57 KG/M2

## 2023-05-03 DIAGNOSIS — Z00.00 PHYSICAL EXAM: ICD-10-CM

## 2023-05-03 DIAGNOSIS — Z00.00 PHYSICAL EXAM: Primary | ICD-10-CM

## 2023-05-03 LAB
ALBUMIN SERPL-MCNC: 4.1 G/DL (ref 3.4–5)
ALBUMIN/GLOB SERPL: 1.4 {RATIO} (ref 1–2)
ALP LIVER SERPL-CCNC: 75 U/L
ALT SERPL-CCNC: 60 U/L
ANION GAP SERPL CALC-SCNC: 7 MMOL/L (ref 0–18)
AST SERPL-CCNC: 26 U/L (ref 15–37)
BASOPHILS # BLD AUTO: 0.05 X10(3) UL (ref 0–0.2)
BASOPHILS NFR BLD AUTO: 0.9 %
BILIRUB SERPL-MCNC: 0.5 MG/DL (ref 0.1–2)
BILIRUB UR QL: NEGATIVE
BUN BLD-MCNC: 14 MG/DL (ref 7–18)
BUN/CREAT SERPL: 14.9 (ref 10–20)
CALCIUM BLD-MCNC: 9.1 MG/DL (ref 8.5–10.1)
CHLORIDE SERPL-SCNC: 105 MMOL/L (ref 98–112)
CHOLEST SERPL-MCNC: 185 MG/DL (ref ?–200)
CLARITY UR: CLEAR
CO2 SERPL-SCNC: 26 MMOL/L (ref 21–32)
COLOR UR: COLORLESS
CREAT BLD-MCNC: 0.94 MG/DL
DEPRECATED RDW RBC AUTO: 38.8 FL (ref 35.1–46.3)
EOSINOPHIL # BLD AUTO: 0.11 X10(3) UL (ref 0–0.7)
EOSINOPHIL NFR BLD AUTO: 2 %
ERYTHROCYTE [DISTWIDTH] IN BLOOD BY AUTOMATED COUNT: 12.6 % (ref 11–15)
EST. AVERAGE GLUCOSE BLD GHB EST-MCNC: 134 MG/DL (ref 68–126)
FASTING PATIENT LIPID ANSWER: YES
FASTING STATUS PATIENT QL REPORTED: YES
GFR SERPLBLD BASED ON 1.73 SQ M-ARVRAT: 103 ML/MIN/1.73M2 (ref 60–?)
GLOBULIN PLAS-MCNC: 3 G/DL (ref 2.8–4.4)
GLUCOSE BLD-MCNC: 140 MG/DL (ref 70–99)
GLUCOSE UR-MCNC: NORMAL MG/DL
HBA1C MFR BLD: 6.3 % (ref ?–5.7)
HCT VFR BLD AUTO: 42.7 %
HDLC SERPL-MCNC: 49 MG/DL (ref 40–59)
HGB BLD-MCNC: 14.3 G/DL
HGB UR QL STRIP.AUTO: NEGATIVE
IMM GRANULOCYTES # BLD AUTO: 0.02 X10(3) UL (ref 0–1)
IMM GRANULOCYTES NFR BLD: 0.4 %
KETONES UR-MCNC: NEGATIVE MG/DL
LDLC SERPL CALC-MCNC: 123 MG/DL (ref ?–100)
LEUKOCYTE ESTERASE UR QL STRIP.AUTO: NEGATIVE
LYMPHOCYTES # BLD AUTO: 1.86 X10(3) UL (ref 1–4)
LYMPHOCYTES NFR BLD AUTO: 33.6 %
MCH RBC QN AUTO: 28.6 PG (ref 26–34)
MCHC RBC AUTO-ENTMCNC: 33.5 G/DL (ref 31–37)
MCV RBC AUTO: 85.4 FL
MONOCYTES # BLD AUTO: 0.62 X10(3) UL (ref 0.1–1)
MONOCYTES NFR BLD AUTO: 11.2 %
NEUTROPHILS # BLD AUTO: 2.88 X10 (3) UL (ref 1.5–7.7)
NEUTROPHILS # BLD AUTO: 2.88 X10(3) UL (ref 1.5–7.7)
NEUTROPHILS NFR BLD AUTO: 51.9 %
NITRITE UR QL STRIP.AUTO: NEGATIVE
NONHDLC SERPL-MCNC: 136 MG/DL (ref ?–130)
OSMOLALITY SERPL CALC.SUM OF ELEC: 289 MOSM/KG (ref 275–295)
PH UR: 5.5 [PH] (ref 5–8)
PLATELET # BLD AUTO: 264 10(3)UL (ref 150–450)
POTASSIUM SERPL-SCNC: 3.8 MMOL/L (ref 3.5–5.1)
PROT SERPL-MCNC: 7.1 G/DL (ref 6.4–8.2)
PROT UR-MCNC: NEGATIVE MG/DL
RBC # BLD AUTO: 5 X10(6)UL
SODIUM SERPL-SCNC: 138 MMOL/L (ref 136–145)
SP GR UR STRIP: 1.01 (ref 1–1.03)
TRIGL SERPL-MCNC: 67 MG/DL (ref 30–149)
TSI SER-ACNC: 1.2 MIU/ML (ref 0.36–3.74)
UROBILINOGEN UR STRIP-ACNC: NORMAL
VIT D+METAB SERPL-MCNC: 22.7 NG/ML (ref 30–100)
VLDLC SERPL CALC-MCNC: 12 MG/DL (ref 0–30)
WBC # BLD AUTO: 5.5 X10(3) UL (ref 4–11)

## 2023-05-03 PROCEDURE — 3079F DIAST BP 80-89 MM HG: CPT | Performed by: FAMILY MEDICINE

## 2023-05-03 PROCEDURE — 82306 VITAMIN D 25 HYDROXY: CPT

## 2023-05-03 PROCEDURE — 83036 HEMOGLOBIN GLYCOSYLATED A1C: CPT

## 2023-05-03 PROCEDURE — 84443 ASSAY THYROID STIM HORMONE: CPT

## 2023-05-03 PROCEDURE — 99396 PREV VISIT EST AGE 40-64: CPT | Performed by: FAMILY MEDICINE

## 2023-05-03 PROCEDURE — 80053 COMPREHEN METABOLIC PANEL: CPT

## 2023-05-03 PROCEDURE — 80061 LIPID PANEL: CPT

## 2023-05-03 PROCEDURE — 36415 COLL VENOUS BLD VENIPUNCTURE: CPT

## 2023-05-03 PROCEDURE — 85025 COMPLETE CBC W/AUTO DIFF WBC: CPT

## 2023-05-03 PROCEDURE — 3008F BODY MASS INDEX DOCD: CPT | Performed by: FAMILY MEDICINE

## 2023-05-03 PROCEDURE — 3074F SYST BP LT 130 MM HG: CPT | Performed by: FAMILY MEDICINE

## 2023-05-03 RX ORDER — MELOXICAM 15 MG/1
15 TABLET ORAL DAILY
COMMUNITY
Start: 2023-04-11

## 2023-05-04 RX ORDER — ERGOCALCIFEROL 1.25 MG/1
50000 CAPSULE ORAL WEEKLY
Qty: 12 CAPSULE | Refills: 4 | Status: SHIPPED | OUTPATIENT
Start: 2023-05-04 | End: 2023-06-03

## 2023-08-25 ENCOUNTER — TELEPHONE (OUTPATIENT)
Dept: GASTROENTEROLOGY | Facility: CLINIC | Age: 43
End: 2023-08-25

## 2023-08-25 ENCOUNTER — OFFICE VISIT (OUTPATIENT)
Dept: GASTROENTEROLOGY | Facility: CLINIC | Age: 43
End: 2023-08-25

## 2023-08-25 VITALS
BODY MASS INDEX: 31.85 KG/M2 | HEART RATE: 59 BPM | WEIGHT: 225 LBS | DIASTOLIC BLOOD PRESSURE: 106 MMHG | SYSTOLIC BLOOD PRESSURE: 165 MMHG | HEIGHT: 70.5 IN

## 2023-08-25 DIAGNOSIS — R13.10 DYSPHAGIA, UNSPECIFIED TYPE: ICD-10-CM

## 2023-08-25 DIAGNOSIS — R14.2 BELCHING SYMPTOM: ICD-10-CM

## 2023-08-25 DIAGNOSIS — K62.5 RECTAL BLEEDING: ICD-10-CM

## 2023-08-25 DIAGNOSIS — K21.9 GASTROESOPHAGEAL REFLUX DISEASE, UNSPECIFIED WHETHER ESOPHAGITIS PRESENT: Primary | ICD-10-CM

## 2023-08-25 DIAGNOSIS — Z86.19 HISTORY OF HELICOBACTER PYLORI INFECTION: Primary | ICD-10-CM

## 2023-08-25 DIAGNOSIS — R07.89 ATYPICAL CHEST PAIN: ICD-10-CM

## 2023-08-25 DIAGNOSIS — K21.9 GASTROESOPHAGEAL REFLUX DISEASE, UNSPECIFIED WHETHER ESOPHAGITIS PRESENT: ICD-10-CM

## 2023-08-25 PROCEDURE — 3008F BODY MASS INDEX DOCD: CPT | Performed by: INTERNAL MEDICINE

## 2023-08-25 PROCEDURE — 3077F SYST BP >= 140 MM HG: CPT | Performed by: INTERNAL MEDICINE

## 2023-08-25 PROCEDURE — 99243 OFF/OP CNSLTJ NEW/EST LOW 30: CPT | Performed by: INTERNAL MEDICINE

## 2023-08-25 PROCEDURE — 3080F DIAST BP >= 90 MM HG: CPT | Performed by: INTERNAL MEDICINE

## 2023-08-25 RX ORDER — ERGOCALCIFEROL 1.25 MG/1
50000 CAPSULE ORAL WEEKLY
COMMUNITY
Start: 2023-07-28

## 2023-08-25 RX ORDER — CALCIUM CARBONATE 500 MG/1
2 TABLET, CHEWABLE ORAL AS NEEDED
COMMUNITY

## 2023-08-25 RX ORDER — SODIUM PICOSULFATE, MAGNESIUM OXIDE, AND ANHYDROUS CITRIC ACID 10; 3.5; 12 MG/160ML; G/160ML; G/160ML
1 LIQUID ORAL ONCE
Qty: 1 EACH | Refills: 0 | Status: SHIPPED | OUTPATIENT
Start: 2023-08-25 | End: 2023-08-25

## 2023-08-25 NOTE — PROGRESS NOTES
HPI:    Patient ID: Mady Denise is a 37year old  with BMI 12.3 who is referred by Dr. Karyn Moreira for evaluation of symptoms as below. 1.  Mr. Aiden Interiano describes previous GERD symptoms, actually recently improved. Some previous dyspepsia. Tested positive on H. pylori breath test of 10/11/2017  Prescribed clarithromycin/amoxicillin based antibiotics 10/24/2017  Negative follow-up H. pylori breath test 2/6/2018. In addition to intermittent GERD symptoms, Mr. Alondra Otero describes belching, especially with certain exercises. Doing a squat maneuver just about any time will cause a belch. No ashley obstructive symptoms/events with the GERD symptoms. Occasional sensation of food being stuck in the throat with swallowing. He does not cough or vomit it back up. Some burping and regurgitation of food after late meals. A small belch will bring up small particles of food. Some recent fairly constant atypical chest pain since approximately July 2023. This is not associated with exercise or exertion    2. Suspected hemorrhoidal symptoms  Baseline of 2-3 bowel movements per day only while at home. Very regular. Never has the urge to pass a bowel movement while at work. He will typically pass a bowel movement almost immediately postprandial at home only or upon returning home from work. Occasional painless rectal bleeding with bowel movements of uncertain etiology. No previous evaluation. 3.  Postprandial bowel movements    Stable long-term symptom  As above, Mr. Alondra Otero describes a baseline of immediate postprandial bowel movements only after dinner, evenings at home. 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 Some recent right-sided abdominal wall, trunk pain. Seems musculoskeletal.    Very frustrated with some recent weight gain despite exercising regularly. Mr. Alondra Otero runs just about every day.   He has made some changes to his diet trying to reduce sugar intake and carbs. Still recently gaining weight. Wt Readings from Last 20 Encounters:  08/25/23 : 225 lb (102.1 kg)  05/03/23 : 223 lb (101.2 kg)  01/18/22 : 225 lb (102.1 kg)  01/04/22 : 227 lb (103 kg)  01/02/20 : 217 lb 6.4 oz (98.6 kg)  11/07/19 : 216 lb 6.4 oz (98.2 kg)  09/19/19 : 213 lb (96.6 kg)  09/27/18 : 228 lb (103.4 kg)  05/14/18 : 222 lb (100.7 kg)  03/01/18 : 226 lb (102.5 kg)  02/06/18 : 224 lb (101.6 kg)  01/24/18 : 215 lb (97.5 kg)  10/24/17 : 223 lb (101.2 kg)  10/11/17 : 224 lb (101.6 kg)        Previous EGD examination: n  Previous colonoscopy(ies): n    HPI    Review of Systems         Current Outpatient Medications   Medication Sig Dispense Refill    ergocalciferol 1.25 MG (70661 UT) Oral Cap Take 1 capsule (50,000 Units total) by mouth once a week. calcium carbonate 500 MG Oral Chew Tab Chew 2 tablets (1,000 mg total) by mouth as needed for Heartburn. Esomeprazole Magnesium 20 MG Oral Capsule Delayed Release Take 1 capsule (20 mg total) by mouth as needed. Meloxicam 15 MG Oral Tab Take 1 tablet (15 mg total) by mouth daily. Allergies:  Seasonal                  Imaging: No results found. PHYSICAL EXAM:   Physical Exam           ASSESSMENT/PLAN:     37year old  with BMI 49.1 who is referred by Dr. Karyn Moreira for evaluation of symptoms as below. 1.  Mr. Aiden Interiano describes previous GERD symptoms, actually recently improved. Some previous dyspepsia. Tested positive on H. pylori breath test of 10/11/2017  Prescribed clarithromycin/amoxicillin based antibiotics 10/24/2017  Negative follow-up H. pylori breath test 2/6/2018. In addition to intermittent GERD symptoms, Mr. Alondra Otero describes belching, especially with certain exercises. Doing a squat maneuver just about any time will cause a belch. No ashley obstructive symptoms/events with the GERD symptoms.   Occasional sensation of food being stuck in the throat with swallowing. He does not cough or vomit it back up. Some burping and regurgitation of food after late meals. A small belch will bring up small particles of food. Some recent fairly constant atypical chest pain since approximately July 2023. This is not associated with exercise or exertion    2. Suspected hemorrhoidal symptoms  Baseline of 2-3 bowel movements per day only while at home. Very regular. Never has the urge to pass a bowel movement while at work. He will typically pass a bowel movement almost immediately postprandial at home only or upon returning home from work. Occasional painless rectal bleeding with bowel movements of uncertain etiology. No previous evaluation. 3.  Postprandial bowel movements    Stable long-term symptom  As above, Mr. Lilliam Wilks describes a baseline of immediate postprandial bowel movements only after dinner, evenings at home. 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 Some recent right-sided abdominal wall, trunk pain. Seems musculoskeletal.    Very frustrated with some recent weight gain despite exercising regularly. Mr. Lilliam Wilks runs just about every day. He has made some changes to his diet trying to reduce sugar intake and carbs. Still recently gaining weight. Suggest:    1. GERD symptoms with belching, small volumes of regurgitation evenings after large meals as above, swallowing symptoms     Takes an occasional as-needed dose of omeprazole only  Atypical chest pain; nonexertional; nonetheless I recommended cardiac stress testing as per Dr. Sara Duarte  Recurring sensation of food getting stuck in the cervical area throat/esophagus  No previous EGD examination  Symptoms concerning for hiatal hernia, possible fibrosis of the esophagus  I recommended EGD examination to further evaluate, screen for Herring's esophagus, decision making regarding future PPI therapy    2.   History of H. pylori infection diagnosis and treatment 2017     No previous EGD examination  Prescribed clindamycin/amoxicillin antibiotics 10/24/2017 with documented resolution    3. Recurring small-volume rectal bleeding as above  History typical for hemorrhoidal type bleeding  I recommended colonoscopy examination to further evaluate, colon cancer screening    4. Recent right-sided abdominal wall pain  Sounds musculoskeletal by today's interview  EGD and colonoscopy examinations as above  Could consider cross-sectional imaging in the future    5. Colon cancer screening, average risk  Colonoscopy examination as above        Consent:    I recommended EGD and colonoscopy examination with possible biopsy, possible polypectomy. We discussed sedation options of conscious sedation versus MAC anesthesia, and agreed on MAC anesthesia. We discussed the nature and risks of EGD and colonoscopy examination including sedation, anesthesia risks; bleeding, colonic injury or perforation, infection. We discussed the rare occurrence of missed lesions including missed polyps or missed malignancy with colonoscopy examination. The patient understood these risks and agrees to proceed. The need for an accompanying adult to provide a ride home or escort home was also discussed. Clenpiq bowel prep if covered, otherwise GoLYTELY bowel prep. Dx = GERD, atypical chest pain, dysphagia, rectal bleeding          Over 35 minutes spent today discussing the above and counseling this patient, reviewing chart notes and data and composing this note. Digital transcription software was utilized to produce this note. The note was proofread for content only. Typographical errors may remain.        Meds This Visit:  Requested Prescriptions      No prescriptions requested or ordered in this encounter       Imaging & Referrals:  None       YV#[id number]

## 2023-08-25 NOTE — TELEPHONE ENCOUNTER
Scheduled for:  Colonoscopy [phone] , [address] [facility] ,[address]   Provider Name:  Coretha Brunner  Date:  1/23/2024  Location:  [facility]   Sedation:  Mac   Time:  2:30 Pm (pt is aware to arrive at 1:30 Pm)   Prep: Clenpiq,Suprep or Golytely ,Egd  Prep instructions were given to pt in the office, I discuss prep Instructions with patient at the time of the appointment which he verbally understood and given the prep instructions at the time of the appointment  Meds/Allergies Reconciled?:  Physician reviewed     Diagnosis with codes: Lonell Elders K21.9 , atypical chest pain R07.89, Rectal bleeding K62.5  Was patient informed to call insurance with codes (Y/N):  Yes, I confirmed [employer] [place] insurance with the patient. The patient also verbally understands to call his insurance to check for pre-cert, codes were given on prep instructions. Referral sent?:  Referral was sent at the time of electronic surgical scheduling. [facility] or [facility] notified?:  I sent an electronic request to Endo Scheduling and received a confirmation today. Medication Orders: This patient verbally confirmed that he is not taking:   Iron, blood thinners, BP meds, and is not diabetic   Not latex allergy, Not PCN allergy and does not have a pacemaker Pt is aware to NOT take iron pills, herbal meds and diet supplements for 7 days before exam. Also to NOT take any form of alcohol, recreational drugs and any forms of ED meds 24 hours before exam.    Misc Orders:  Patient verbally understood & will await a phone call from [facility] to schedule. Further instructions given by staff:   Patient was informed about the new cancellation policy for his/her procedure. Patient was also given a copy of the cancellation policy at the time of the appointment and verbalized understanding.

## 2023-08-26 DIAGNOSIS — R07.9 CHEST PAIN, UNSPECIFIED TYPE: Primary | ICD-10-CM

## 2023-09-05 ENCOUNTER — TELEPHONE (OUTPATIENT)
Facility: CLINIC | Age: 43
End: 2023-09-05

## 2023-09-05 RX ORDER — SODIUM PICOSULFATE, MAGNESIUM OXIDE, AND ANHYDROUS CITRIC ACID 12; 3.5; 1 G/175ML; G/175ML; MG/175ML
1 LIQUID ORAL ONCE
Qty: 175 ML | Refills: 0 | Status: SHIPPED | OUTPATIENT
Start: 2023-09-05 | End: 2023-09-05

## 2023-09-05 NOTE — TELEPHONE ENCOUNTER
Medication Quantity Refills Start End   Sod Picosulfate-Mag Ox-Cit Acd (CLENPIQ) 10-3.5-12 MG-GM -GM/175ML Oral Solution 175 mL 0 9/5/2023 9/5/2023   Sig:   Take 1 kit by mouth one time for 1 dose.      Route:   Oral     Order #:   [id number]

## 2024-01-23 ENCOUNTER — HOSPITAL ENCOUNTER (OUTPATIENT)
Facility: HOSPITAL | Age: 44
Setting detail: HOSPITAL OUTPATIENT SURGERY
Discharge: HOME | End: 2024-01-23
Attending: INTERNAL MEDICINE | Admitting: INTERNAL MEDICINE
Payer: COMMERCIAL

## 2024-01-23 ENCOUNTER — ANESTHESIA EVENT (OUTPATIENT)
Dept: ENDOSCOPY | Facility: HOSPITAL | Age: 44
End: 2024-01-23
Payer: COMMERCIAL

## 2024-01-23 ENCOUNTER — ANESTHESIA (OUTPATIENT)
Dept: ENDOSCOPY | Facility: HOSPITAL | Age: 44
End: 2024-01-23
Payer: COMMERCIAL

## 2024-01-23 VITALS
BODY MASS INDEX: 33.34 KG/M2 | HEIGHT: 68 IN | HEART RATE: 61 BPM | WEIGHT: 220 LBS | OXYGEN SATURATION: 97 % | SYSTOLIC BLOOD PRESSURE: 126 MMHG | RESPIRATION RATE: 18 BRPM | DIASTOLIC BLOOD PRESSURE: 105 MMHG

## 2024-01-23 DIAGNOSIS — R07.89 ATYPICAL CHEST PAIN: ICD-10-CM

## 2024-01-23 DIAGNOSIS — K62.5 RECTAL BLEEDING: ICD-10-CM

## 2024-01-23 DIAGNOSIS — K21.9 GASTROESOPHAGEAL REFLUX DISEASE, UNSPECIFIED WHETHER ESOPHAGITIS PRESENT: ICD-10-CM

## 2024-01-23 PROCEDURE — 0DBL8ZX EXCISION OF TRANSVERSE COLON, VIA NATURAL OR ARTIFICIAL OPENING ENDOSCOPIC, DIAGNOSTIC: ICD-10-PCS | Performed by: INTERNAL MEDICINE

## 2024-01-23 PROCEDURE — 0DB78ZX EXCISION OF STOMACH, PYLORUS, VIA NATURAL OR ARTIFICIAL OPENING ENDOSCOPIC, DIAGNOSTIC: ICD-10-PCS | Performed by: INTERNAL MEDICINE

## 2024-01-23 PROCEDURE — 88305 TISSUE EXAM BY PATHOLOGIST: CPT | Performed by: INTERNAL MEDICINE

## 2024-01-23 PROCEDURE — 88312 SPECIAL STAINS GROUP 1: CPT | Performed by: INTERNAL MEDICINE

## 2024-01-23 RX ORDER — LIDOCAINE HYDROCHLORIDE 10 MG/ML
INJECTION, SOLUTION EPIDURAL; INFILTRATION; INTRACAUDAL; PERINEURAL AS NEEDED
Status: DISCONTINUED | OUTPATIENT
Start: 2024-01-23 | End: 2024-01-23 | Stop reason: SURG

## 2024-01-23 RX ORDER — NALOXONE HYDROCHLORIDE 0.4 MG/ML
0.08 INJECTION, SOLUTION INTRAMUSCULAR; INTRAVENOUS; SUBCUTANEOUS ONCE AS NEEDED
OUTPATIENT
Start: 2024-01-23 | End: 2024-01-23

## 2024-01-23 RX ORDER — SODIUM CHLORIDE, SODIUM LACTATE, POTASSIUM CHLORIDE, CALCIUM CHLORIDE 600; 310; 30; 20 MG/100ML; MG/100ML; MG/100ML; MG/100ML
INJECTION, SOLUTION INTRAVENOUS CONTINUOUS
Status: DISCONTINUED | OUTPATIENT
Start: 2024-01-23 | End: 2024-01-23

## 2024-01-23 RX ORDER — SODIUM CHLORIDE, SODIUM LACTATE, POTASSIUM CHLORIDE, CALCIUM CHLORIDE 600; 310; 30; 20 MG/100ML; MG/100ML; MG/100ML; MG/100ML
INJECTION, SOLUTION INTRAVENOUS CONTINUOUS
OUTPATIENT
Start: 2024-01-23

## 2024-01-23 RX ORDER — RIBOFLAVIN (VITAMIN B2) 100 MG
100 TABLET ORAL DAILY
COMMUNITY

## 2024-01-23 RX ADMIN — SODIUM CHLORIDE, SODIUM LACTATE, POTASSIUM CHLORIDE, CALCIUM CHLORIDE: 600; 310; 30; 20 INJECTION, SOLUTION INTRAVENOUS at 15:36:00

## 2024-01-23 RX ADMIN — SODIUM CHLORIDE, SODIUM LACTATE, POTASSIUM CHLORIDE, CALCIUM CHLORIDE: 600; 310; 30; 20 INJECTION, SOLUTION INTRAVENOUS at 16:22:00

## 2024-01-23 RX ADMIN — LIDOCAINE HYDROCHLORIDE 50 MG: 10 INJECTION, SOLUTION EPIDURAL; INFILTRATION; INTRACAUDAL; PERINEURAL at 15:39:00

## 2024-01-23 NOTE — ANESTHESIA PREPROCEDURE EVALUATION
Anesthesia PreOp Note    HPI:     Lavon Saucedo is a 43 year old male who presents for preoperative consultation requested by: Yasir Mitchell MD    Date of Surgery: 1/23/2024    Procedure(s):  ESOPHAGOGASTRODUODENOSCOPY  COLONOSCOPY  Indication: Gastroesophageal reflux disease, unspecified whether esophagitis present / Rectal bleeding/ Atypical chest pain    Relevant Problems   No relevant active problems       NPO:  Last Liquid Consumption Date: 01/23/24  Last Liquid Consumption Time: 1030  Last Solid Consumption Date: 01/22/24  Last Solid Consumption Time: 1000  Last Liquid Consumption Date: 01/23/24          History Review:  There are no problems to display for this patient.      Past Medical History:   Diagnosis Date    Helicobacter pylori infection     about 5 yrs ago with positive results       History reviewed. No pertinent surgical history.    Medications Prior to Admission   Medication Sig Dispense Refill Last Dose    Ascorbic Acid (VITAMIN C) 100 MG Oral Tab Take 1 tablet (100 mg total) by mouth daily.   1/20/2024    Stress Formula/Zinc Oral Tab Take 1 tablet by mouth daily.   1/20/2024    ergocalciferol 1.25 MG (92574 UT) Oral Cap Take 1 capsule (50,000 Units total) by mouth once a week.   1/14/2024    calcium carbonate 500 MG Oral Chew Tab Chew 2 tablets (1,000 mg total) by mouth as needed for Heartburn.    at prn    Esomeprazole Magnesium 20 MG Oral Capsule Delayed Release Take 1 capsule (20 mg total) by mouth as needed. (Patient not taking: Reported on 1/12/2024)   Not Taking at prn    Meloxicam 15 MG Oral Tab Take 1 tablet (15 mg total) by mouth daily.        Current Facility-Administered Medications Ordered in Epic   Medication Dose Route Frequency Provider Last Rate Last Admin    lactated ringers infusion   Intravenous Continuous Yasir Mitchell MD 20 mL/hr at 01/23/24 1413 New Bag at 01/23/24 1413     No current [facility]-ordered outpatient medications on file.       Allergies    Allergen Reactions    Seasonal        Family History   Problem Relation Age of Onset    Cancer Father         lung cancer     Social History     Socioeconomic History    Marital status:    Tobacco Use    Smoking status: Never    Smokeless tobacco: Never   Vaping Use    Vaping Use: Never used   Substance and Sexual Activity    Alcohol use: Yes     Comment: Infrequently can be 1 or two times a week or month    Drug use: No    Sexual activity: Yes     Partners: Female       Available pre-op labs reviewed.             Vital Signs:  Body mass index is 33.45 kg/m².   height is 1.727 m (5' 8\") and weight is 99.8 kg (220 lb). His blood pressure is 135/90 and his pulse is 67. His respiration is 11 and oxygen saturation is 96%.   Vitals:    01/12/24 0926 01/23/24 1408   BP:  135/90   Pulse:  67   Resp:  11   SpO2:  96%   Weight: 99.8 kg (220 lb)    Height: 1.727 m (5' 8\")         Anesthesia Evaluation     Patient summary reviewed and Nursing notes reviewed    History of anesthetic complications   Airway   Mallampati: I  TM distance: >3 FB  Neck ROM: full  Dental          Pulmonary - negative ROS and normal exam   Cardiovascular - negative ROS and normal exam  Exercise tolerance: good    Neuro/Psych - negative ROS     GI/Hepatic/Renal    (+) GERD, bowel prep    Endo/Other - negative ROS   Abdominal   (+) obese                 Anesthesia Plan:   ASA:  2  Plan:   General  Informed Consent Plan and Risks Discussed With:  Patient  Discussed plan with:  Surgeon      I have informed Lavon Saucedo and/or legal guardian or family member of the nature of the anesthetic plan, benefits, risks including possible dental damage if relevant, major complications, and any alternative forms of anesthetic management.   All of the patient's questions were answered to the best of my ability. The patient desires the anesthetic management as planned.  LISA MALONE, OSITO  1/23/2024 3:10 PM  Present on Admission:  **None**

## 2024-01-23 NOTE — H&P
History & Physical Examination    Patient Name: Lavon Saucedo  MRN: [id number]  [facility]: [id number]  YOB: 1980    Diagnosis: GERD symptoms, history H. pylori, atypical chest pain, dysphagia, rectal bleeding    PRESENT ILLNESS: 43-year-old gentleman no active medical problems who presents for EGD and colonoscopy examinations for evaluation of the above.      BMI Readings from Last 1 Encounters:   01/12/24 33.45 kg/m²         Medications Prior to Admission   Medication Sig Dispense Refill Last Dose    Ascorbic Acid (VITAMIN C) 100 MG Oral Tab Take 1 tablet (100 mg total) by mouth daily.   1/20/2024    Stress Formula/Zinc Oral Tab Take 1 tablet by mouth daily.   1/20/2024    ergocalciferol 1.25 MG (98556 UT) Oral Cap Take 1 capsule (50,000 Units total) by mouth once a week.   1/14/2024    calcium carbonate 500 MG Oral Chew Tab Chew 2 tablets (1,000 mg total) by mouth as needed for Heartburn.    at prn    Esomeprazole Magnesium 20 MG Oral Capsule Delayed Release Take 1 capsule (20 mg total) by mouth as needed. (Patient not taking: Reported on 1/12/2024)   Not Taking at prn    Meloxicam 15 MG Oral Tab Take 1 tablet (15 mg total) by mouth daily.        Current Facility-Administered Medications   Medication Dose Route Frequency    lactated ringers infusion   Intravenous Continuous       Allergies:   Allergies   Allergen Reactions    Seasonal        Past Medical History:   Diagnosis Date    Helicobacter pylori infection     about 5 yrs ago with positive results     History reviewed. No pertinent surgical history.  Family History   Problem Relation Age of Onset    Cancer Father         lung cancer     Social History     Tobacco Use    Smoking status: Never    Smokeless tobacco: Never   Substance Use Topics    Alcohol use: Yes     Comment: Infrequently can be 1 or two times a week or month       SYSTEM Check if Review is Normal Check if Physical Exam is Normal If not normal, please explain:   HEENT [ ] [X ]    NECK &  BACK [ ] [ ]    HEART [ X ] [ X ]    LUNGS [ X ] [ X ]    ABDOMEN [ X ] [ X ]    UROGENITAL [ ] [ ]    EXTREMITIES [ ] [ ]    OTHER        See Anesthesia documentation    [ x ] I have discussed the risks and benefits and alternatives with the patient/family.  They understand and agree to proceed with plan of care.  [ x ] I have reviewed the History and Physical done within the last 30 days.  Any changes noted above.    Yasir Mitchell MD  1/23/2024  3:21 PM

## 2024-01-23 NOTE — DISCHARGE INSTRUCTIONS
.  .  .  Notes from Dr Mitchell:  Healthy stomach endoscopy examination today.  Healthy normal esophagus.  There is some mild chronic irritation of your stomach, probably does not mean much but I took some samples/biopsies of the lining of your stomach.  One small benign colon polyp was found and removed today - to be sent to the lab to be examined - a letter will be sent with results.  You may see small quantities of blood with your next 1-2 bowel movements today.    If you check your results on [employer], the \"pathology\" report on the colon polyp(s) should be released within the next 24-48 hours.  In general, a \"hyperplastic\" colon polyp is completely benign, vs an \"adenoma\" or \"sessile serrated adenoma\" which is considered a benign but precancerous colon polyp.  That will be explained in a letter/email from me as well.  No aspirin, Excedrin, Advil/Motrin/ibuprofen, Aleve/naproxen for next 5 days (to prevent bleeding.)  Internal hemorrhoids - very common  If you are raw and irritated back there after all of that diarrhea and wiping, three good options to soothe and heal the irritation include Aquaphor ointment, \"Desitin\" or \"A & D\" diaper ointment, or good old-fashioned Vaseline.  All of these soothe and create a protective barrier so that your skin can heal.  I recommend repeat colonoscopy exam in 5 years if that is a real `precancerous' polyp, otherwise likely in 7+ years.    Home Care Instructions for Colonoscopy with Sedation    Diet:  - Resume your regular diet as tolerated unless otherwise instructed.  - Start with light meals to minimize bloating.  - Do not drink alcohol today.    Medication:  - If you have questions about resuming your normal medications, please contact your Primary Care Physician.    Activities:  - Take it easy today. Do not return to work today.  - Do not drive today.  - Do not operate any machinery today (including kitchen equipment).    Colonoscopy:  - You may notice some rectal \"spotting\"  (a little blood on the toilet tissue) for a day or two after the exam. This is normal.  - If you experience any rectal bleeding (not spotting), persistent tenderness or sharp severe abdominal pains, oral temperature over 100 degrees Fahrenheit, light-headedness or dizziness, or any other problems, contact your doctor.    **If unable to reach your doctor, please go to the [facility] Emergency Room**    - Your referring physician will receive a full report of your examination.  - If you do not hear from your doctor's office within two weeks of your biopsy, please call them for your results.    You may be able to see your laboratory results in [employer] between 4 and 7 business days.  In some cases, your physician may not have viewed the results before they are released to [employer].  If you have questions regarding your results contact the physician who ordered the test/exam by phone or via [employer] by choosing \"Ask a Medical Question.\"Home Care Instructions for Gastroscopy with Sedation    Diet:  - Resume your regular diet as tolerated unless otherwise instructed.  - Start with light meals to minimize bloating.  - Do not drink alcohol today.    Medication:  - If you have questions about resuming your normal medications, please contact your Primary Care Physician.    Activities:  - Take it easy today. Do not return to work today.  - Do not drive today.  - Do not operate any machinery today (including kitchen equipment).    Gastroscopy:  - You may have a sore throat for 2-3 days following the exam. This is normal. Gargling with warm salt water (1/2 tsp salt to 1 glass warm water) or using throat lozenges will help.  - If you experience any sharp pain in your neck, abdomen or chest, vomiting of blood, oral temperature over 100 degrees Fahrenheit, light-headedness or dizziness, or any other problems, contact your doctor.    **If unable to reach your doctor, please go to the [facility] Emergency  Room**    - Your referring physician will receive a full report of your examination.  - If you do not hear from your doctor's office within two weeks of your biopsy, please call them for your results.    You may be able to see your laboratory results in [employer] between 4 and 7 business days.  In some cases, your physician may not have viewed the results before they are released to [employer].  If you have questions regarding your results contact the physician who ordered the test/exam by phone or via [employer] by choosing \"Ask a Medical Question.\"  .  .  .

## 2024-01-23 NOTE — ANESTHESIA POSTPROCEDURE EVALUATION
Patient: Lavon Saucedo    Procedure Summary       Date: 01/23/24 Room / Location: [facility] ENDOSCOPY 05 / EM ENDOSCOPY    Anesthesia Start: 1536 Anesthesia Stop: 1626    Procedures:       ESOPHAGOGASTRODUODENOSCOPY with biopsies      COLONOSCOPY with polypectomy Diagnosis:       Gastroesophageal reflux disease, unspecified whether esophagitis present      Rectal bleeding      Atypical chest pain      (gastritis, colon polyps, hemorrhoids)    Surgeons: Yasir Mitchell MD Anesthesiologist: Trudy Leggett CRNA    Anesthesia Type: general ASA Status: 2            Anesthesia Type: general    Vitals Value Taken Time   /89 01/23/24 1626   Temp  01/23/24 1626   Pulse 71 01/23/24 1626   Resp 15 01/23/24 1626   SpO2 96 % 01/23/24 1626       [facility] AN Post Evaluation:   Patient Evaluated in PACU  Patient Participation: complete - patient participated  Level of Consciousness: awake and alert  Pain Score: 0  Pain Management: adequate  Airway Patency:patent  Dental exam unchanged from preop  Yes    Cardiovascular Status: acceptable  Respiratory Status: acceptable  Postoperative Hydration acceptable      TRUDY LEGGETT CRNA  1/23/2024 4:26 PM

## 2024-01-23 NOTE — OPERATIVE REPORT
[facility]    EGD AND COLONOSCOPY PROCEDURE REPORTS:    DATE OF PROCEDURE:  1/23/2024    PCP: MICHAEL PEREZ MD     PREOPERATIVE DIAGNOSIS:  GERD symptoms, history H. pylori, atypical chest pain, dysphagia, rectal bleeding     POSTOPERATIVE DIAGNOSIS:  See impression.     SURGEON:  Yasir Mitchell M.D.     SEDATION:    MAC anesthesia provided by the Anesthesia Service.  MAC anesthesia requested due to age 43, high risk neck and airway, anticipated intolerance of the EGD and colonoscopy examination under safe doses conscious sedation medications    Estimated blood loss: none/insignificant       EGD PROCEDURE:    After the nature and risks of EGD and colonoscopy examinations under MAC anesthesia were discussed with the patient and his wife and all questions answered, informed consent was obtained.  The patient was sedated as above.      Once sedated, the Olympus adult diagnostic gastroscope was placed in the patient's mouth and advanced under direct visualization through the oropharynx into the esophagus, on down through the stomach and pylorus to the duodenal bulb and second portions of the duodenum.  Retroflexion was performed in the stomach.     EGD FINDINGS:    Esophagus and GE junction: Healthy and normal esophagus down to a normal GE junction and Z-line.    No hiatal hernia on forward or retroflexed views.    Stomach: Clear secretions present.  Mild chronic atrophic gastritis changes seen in this patient with history of H. pylori infection.  Random gastric mucosal biopsies obtained.    Duodenum: Clear secretions present. Normal duodenal bulb and second portion duodenum.    COLONOSCOPY PROCEDURE:    The patient was repositioned for colonoscopy examination.  Additional sedation given.  Digital rectal exam was performed, which showed no masses.  The Olympus pediatric video colonoscope was advanced under direct visualization through the entire length of the colon to the cecum.  Unable to  perform retroflex exam in the ascending colon; therefore several trips performed up the ascending colon to the hepatic flexure.  Cecum was confirmed by landmarks, including appendiceal orifice, cecal trifold, ileocecal valve.  Retroflexion was performed in the rectum.    The quality of the prep was excellent.     COLONOSCOPY FINDINGS:  Sessile 5 mm colon polyp removed from the mid transverse colon by cold snare polypectomy technique, suctioned out.  Small internal hemorrhoids.  No red royer signs.  Nothing bleeding at the beginning or conclusion of today's exam.     IMPRESSION:  Normal esophagus and GE junction.    Mild chronic atrophic gastritis changes only on EGD examination.  Random gastric mucosal biopsies obtained.  Sessile 5 mm colon polyp removed from the mid transverse colon by cold snare polypectomy technique, suctioned out.  Small internal hemorrhoids.  No red royer signs.  Nothing bleeding at the beginning or conclusion of today's exam.     RECOMMENDATIONS:  Followup above gastric mucosal biopsy results.  Follow-up above colon polyp pathology results.  High-fiber diet.  Repeat colonoscopy examination in 5 years, or as per polyp pathology results.    No aspirin or NSAID medications for the next 5 days to prevent bleeding

## 2024-02-20 ENCOUNTER — MED REC SCAN ONLY (OUTPATIENT)
Facility: CLINIC | Age: 44
End: 2024-02-20

## 2024-02-26 ENCOUNTER — TELEPHONE (OUTPATIENT)
Facility: CLINIC | Age: 44
End: 2024-02-26

## 2024-02-26 ENCOUNTER — HOSPITAL ENCOUNTER (OUTPATIENT)
Age: 44
Discharge: HOME | End: 2024-02-26
Payer: COMMERCIAL

## 2024-02-26 VITALS
HEART RATE: 98 BPM | OXYGEN SATURATION: 97 % | DIASTOLIC BLOOD PRESSURE: 110 MMHG | SYSTOLIC BLOOD PRESSURE: 150 MMHG | TEMPERATURE: 98 F | RESPIRATION RATE: 18 BRPM

## 2024-02-26 DIAGNOSIS — R03.0 ELEVATED BLOOD-PRESSURE READING, WITHOUT DIAGNOSIS OF HYPERTENSION: ICD-10-CM

## 2024-02-26 DIAGNOSIS — J06.9 VIRAL URI WITH COUGH: Primary | ICD-10-CM

## 2024-02-26 PROCEDURE — 99203 OFFICE O/P NEW LOW 30 MIN: CPT | Performed by: PHYSICIAN ASSISTANT

## 2024-02-26 RX ORDER — BENZONATATE 100 MG/1
100 CAPSULE ORAL 3 TIMES DAILY PRN
Qty: 21 CAPSULE | Refills: 0 | Status: SHIPPED | OUTPATIENT
Start: 2024-02-26 | End: 2024-03-04

## 2024-02-26 RX ORDER — ALBUTEROL SULFATE 90 UG/1
2 AEROSOL, METERED RESPIRATORY (INHALATION) EVERY 4 HOURS PRN
Qty: 1 EACH | Refills: 0 | Status: SHIPPED | OUTPATIENT
Start: 2024-02-26 | End: 2024-03-27

## 2024-02-26 NOTE — ED PROVIDER NOTES
Patient Seen in: [facility]      History     Chief Complaint   Patient presents with    Cough     Stated Complaint: Flu - Chest congestion, hurts when i cough    Subjective:   HPI    43-year-old male presenting for evaluation of cough, congestion.  Patient reports symptoms for the last 1 week.  No associated fever/chills.  Patient's daughter tested positive for COVID last week, patient has taken 2 at home COVID test which were both negative.  Last test 4 days ago.    Objective:   Past Medical History:   Diagnosis Date    Helicobacter pylori infection     about 5 yrs ago with positive results              Past Surgical History:   Procedure Laterality Date    COLONOSCOPY N/A 1/23/2024    Procedure: ESOPHAGOGASTRODUODENOSCOPY with biopsies;  Surgeon: Yasir Mitchell MD;  Location: [facility] ENDOSCOPY                Social History     Socioeconomic History    Marital status:    Tobacco Use    Smoking status: Never    Smokeless tobacco: Never   Vaping Use    Vaping Use: Never used   Substance and Sexual Activity    Alcohol use: Yes     Comment: Infrequently can be 1 or two times a week or month    Drug use: No    Sexual activity: Yes     Partners: Female              Review of Systems    Positive for stated complaint: Flu - Chest congestion, hurts when i cough  Other systems are as noted in HPI.  Constitutional and vital signs reviewed.      All other systems reviewed and negative except as noted above.    Physical Exam     ED Triage Vitals [02/26/24 0847]   BP (!) 177/102   Pulse 98   Resp 18   Temp 97.6 °F (36.4 °C)   Temp src Temporal   SpO2 97 %   O2 Device None (Room air)       Current:BP (!) 150/110   Pulse 98   Temp 97.6 °F (36.4 °C) (Temporal)   Resp 18   SpO2 97%         Physical Exam  Vitals and nursing note reviewed.   Constitutional:       General: He is not in acute distress.  HENT:      Head: Normocephalic and atraumatic.      Right Ear: External ear normal.      Left Ear:  External ear normal.      Nose: Nose normal.      Mouth/Throat:      Mouth: Mucous membranes are moist.   Eyes:      Extraocular Movements: Extraocular movements intact.      Pupils: Pupils are equal, round, and reactive to light.   Cardiovascular:      Rate and Rhythm: Normal rate.   Pulmonary:      Effort: Pulmonary effort is normal.   Abdominal:      General: Abdomen is flat.   Musculoskeletal:         General: Normal range of motion.      Cervical back: Normal range of motion.   Skin:     General: Skin is warm.   Neurological:      General: No focal deficit present.      Mental Status: He is alert and oriented to person, place, and time.   Psychiatric:         Mood and Affect: Mood normal.         Behavior: Behavior normal.           Labs Reviewed - No data to display     43-year-old male presenting for evaluation of URI symptoms, cough and chest congestion.  Patient initially quite hypertensive, he does not take medications for high blood pressure.  He does state that his blood pressure is always elevated    Ddx- viral uri with cough, COVID, bronchitis    Pt lungs clear, no hypoxia, no tachycardia.   Tessalon, albuterol rx'd to pharmacy.  Supportive care, expectant mgmt    Repeat BP still elevated at 150/110.  Discussed BP monitoring at home and follow-up with primary care provider.  Patient states he is due for his annual physical very soon.  He is asymptomatic           MDM                                         Medical Decision Making      Disposition and Plan     Clinical Impression:  1. Viral URI with cough         Disposition:  There is no disposition on file for this visit.  There is no disposition time on file for this visit.    Follow-up:  Von Dobson MD  [address] [place] [id number]  [phone]                Medications Prescribed:  Current Discharge Medication List        START taking these medications    Details   benzonatate 100 MG Oral Cap Take 1 capsule (100 mg total) by  mouth 3 (three) times daily as needed for cough.  Qty: 21 capsule, Refills: 0      albuterol 108 (90 Base) MCG/ACT Inhalation Aero Soln Inhale 2 puffs into the lungs every 4 (four) hours as needed for Wheezing.  Qty: 1 each, Refills: 0

## 2024-02-26 NOTE — TELEPHONE ENCOUNTER
Results letter mailed to patient per   Colon recall entered for repeat in 7 yrs,1/23/2031  Colon done in 1/23/2024  HM Updated and Patient Outreach was placed for Colon recall     Yasir Mitchell MD  P Em Gi Clinical Staff  GI RNs - 1. Please print and mail this letter to patient; 2. Recall for colonoscopy exam in 7 years

## 2025-04-02 ENCOUNTER — APPOINTMENT (OUTPATIENT)
Dept: GENERAL RADIOLOGY | Age: 45
End: 2025-04-02
Attending: EMERGENCY MEDICINE
Payer: COMMERCIAL

## 2025-04-02 ENCOUNTER — HOSPITAL ENCOUNTER (OUTPATIENT)
Age: 45
Discharge: HOME | End: 2025-04-02
Attending: EMERGENCY MEDICINE
Payer: COMMERCIAL

## 2025-04-02 VITALS
HEART RATE: 73 BPM | RESPIRATION RATE: 20 BRPM | SYSTOLIC BLOOD PRESSURE: 154 MMHG | TEMPERATURE: 98 F | OXYGEN SATURATION: 99 % | DIASTOLIC BLOOD PRESSURE: 92 MMHG

## 2025-04-02 DIAGNOSIS — I10 HYPERTENSION, UNSPECIFIED TYPE: ICD-10-CM

## 2025-04-02 DIAGNOSIS — S93.401A SPRAIN OF RIGHT ANKLE, UNSPECIFIED LIGAMENT, INITIAL ENCOUNTER: Primary | ICD-10-CM

## 2025-04-02 PROCEDURE — 99214 OFFICE O/P EST MOD 30 MIN: CPT

## 2025-04-02 PROCEDURE — 99213 OFFICE O/P EST LOW 20 MIN: CPT

## 2025-04-02 PROCEDURE — 73630 X-RAY EXAM OF FOOT: CPT | Performed by: EMERGENCY MEDICINE

## 2025-04-02 PROCEDURE — 73590 X-RAY EXAM OF LOWER LEG: CPT | Performed by: EMERGENCY MEDICINE

## 2025-04-02 PROCEDURE — 73610 X-RAY EXAM OF ANKLE: CPT | Performed by: EMERGENCY MEDICINE

## 2025-04-02 NOTE — ED PROVIDER NOTES
Patient Seen in: [facility] [place]      History     Chief Complaint   Patient presents with    Leg or Foot Injury     Fell down stairs left foot/ankle pain and swelling - Entered by patient     Stated Complaint: Leg or Foot Injury - Fell down stairs left foot/ankle pain and swelling    Subjective:   HPI      Patient is a 44-year-old male with no significant past medical history who presents now with right leg injury.  The patient states that approximately 8 PM last night, he accidentally \"missed a step\" while going down his stairs and landed awkwardly on his right foot/ankle.  The patient presents now with persistent pain.  Patient states pain is most pronounced overlying the right lateral malleolus though he does have some pain in the proximal fibular area as well.  Patient states the pain worsens with bearing weight.    Objective:     Past Medical History:    Helicobacter pylori infection    about 5 yrs ago with positive results              Past Surgical History:   Procedure Laterality Date    Colonoscopy N/A 1/23/2024    Procedure: ESOPHAGOGASTRODUODENOSCOPY with biopsies;  Surgeon: Yasir Mitchell MD;  Location: [facility] ENDOSCOPY                No pertinent social history.            Review of Systems    Positive for stated complaint: Leg or Foot Injury - Fell down stairs left foot/ankle pain and swelling  Other systems are as noted in HPI.  Constitutional and vital signs reviewed.      All other systems reviewed and negative except as noted above.    Physical Exam     ED Triage Vitals [04/02/25 1600]   BP (!) 152/114   Pulse 73   Resp 20   Temp 98 °F (36.7 °C)   Temp src Oral   SpO2 99 %   O2 Device None (Room air)       Current Vitals:   Vital Signs  BP: (!) 154/92  Pulse: 73  Resp: 20  Temp: 98 °F (36.7 °C)  Temp src: Oral    Oxygen Therapy  SpO2: 99 %  O2 Device: None (Room air)        Physical Exam    Constitutional: Well-developed well-nourished in no acute distress  Head: Normocephalic, no  swelling or tenderness  Eyes: Nonicteric sclera, no conjunctival injection  Vascular: Palpable right posterior tibial pulse with good capillary refill into all toes  Neurologic: Patient is awake, alert and oriented ×3.  The patient's motor strength is 5 out of 5 and symmetric in the upper and lower extremities bilaterally  Extremities: There is mild swelling and minimal tenderness to the dorsal aspect of the right foot.  There is moderate swelling and mild tenderness overlying the right lateral malleolus.  There is minimal tenderness without significant swelling to the both the medial and lateral aspects of the mid tibia/fibular area.  Skin: No pallor, no redness or warmth to the touch      ED Course   Labs Reviewed - No data to display  Patient is x-rays were independently reviewed by myself.  There is soft tissue swelling about the ankle, but no fracture of the right tib-fib/ankle/foot       Patient's x-rays were discussed with him.  The patient's blood pressure readings were discussed with him.  The patient has had elevated blood pressure here on his last 2 visits; recommend follow-up with primary MD for recheck.  Will provide with Ortho follow-up for any persistent ankle pain       MDM      Right foot/ankle sprain versus fracture        Medical Decision Making      Disposition and Plan     Clinical Impression:  1. Sprain of right ankle, unspecified ligament, initial encounter    2. Hypertension, unspecified type         Disposition:  Discharge  4/2/2025  4:57 pm    Follow-up:  Von Dobson MD  [address] [facility] [address]  [facility] [address]  [phone]      Repeat blood pressure check    Pinky Mccord MD  [address] [facility] [address]  [place] [id number] [phone]      For any persistent ankle pain          Medications Prescribed:  Current Discharge Medication List              Supplementary Documentation:

## 2025-04-02 NOTE — DISCHARGE INSTRUCTIONS
Ice and elevation for pain and swelling.  Ibuprofen for pain.  Air splint for comfort.  Crutch walking as needed

## 2025-04-02 NOTE — ED INITIAL ASSESSMENT (HPI)
To room 1 via crutches.  Patient states last night he missed a step and fell down.  + swelling and pain to right foot and ankle.  Unable to fully weight bear to rle due to pain.

## (undated) DEVICE — STERILE LATEX POWDER-FREE SURGICAL GLOVESWITH NITRILE COATING: Brand: PROTEXIS

## (undated) DEVICE — YANKAUER,BULB TIP,W/O VENT,RIGID,STERILE: Brand: MEDLINE

## (undated) DEVICE — SNARE OPTMZ PLPCTM TRP

## (undated) DEVICE — MEDI-VAC NON-CONDUCTIVE SUCTION TUBING: Brand: CARDINAL HEALTH

## (undated) DEVICE — CONMED SCOPE SAVER BITE BLOCK, 20X27 MM: Brand: SCOPE SAVER

## (undated) DEVICE — KIT ENDO ORCAPOD 160/180/190

## (undated) DEVICE — Device: Brand: DUAL NARE NASAL CANNULAE FEMALE LUER CON 7FT O2 TUBE

## (undated) DEVICE — KIT CLEAN ENDOKIT 1.1OZ GOWNX2

## (undated) DEVICE — GIJAW SINGLE-USE BIOPSY FORCEPS WITH NEEDLE: Brand: GIJAW

## (undated) DEVICE — MEDI-VAC NON-CONDUCTIVE SUCTION TUBING 6MM X 1.8M (6FT.) L: Brand: CARDINAL HEALTH

## (undated) DEVICE — 60 ML SYRINGE REGULAR TIP: Brand: MONOJECT

## (undated) DEVICE — SYRINGE MNJCT 10ML LF STRL REG

## (undated) NOTE — LETTER
[facility]  Administration of Anesthesia  ILavon agree to be cared for by a physician anesthesiologist alone and/or with a nurse anesthetist, who is specially trained to monitor me and give me medicine to put me to sleep or keep me comfortable during my procedure    I understand that my anesthesiologist and/or anesthetist is not an employee or agent of [facility] or [employer]. He or she works for [facility] [facility]    As the patient asking for anesthesia services, I agree to:  Allow the anesthesiologist (anesthesia doctor) to give me medicine and do additional procedures as necessary. Some examples are: Starting or using an “IV” to give me medicine, fluids or blood during my procedure, and having a breathing tube placed to help me breathe when I’m asleep (intubation). In the event that my heart stops working properly, I understand that my anesthesiologist will make every effort to sustain my life, unless otherwise directed by [facility] Do Not Resuscitate documents.  Tell my anesthesia doctor before my procedure:  If I am pregnant.  The last time that I ate or drank.  iii. All of the medicines I take (including prescriptions, herbal supplements, and pills I can buy without a prescription (including street drugs/illegal medications). Failure to inform my anesthesiologist about these medicines may increase my risk of anesthetic complications.  iv.If I am allergic to anything or have had a reaction to anesthesia before.  I understand how the anesthesia medicine will help me (benefits).  I understand that with any type of anesthesia medicine there are risks:  The most common risks are: nausea, vomiting, sore throat, muscle soreness, damage to my eyes, mouth, or teeth (from breathing tube placement).  Rare risks include: remembering what happened during my procedure, allergic reactions to medications, injury to my airway, heart, lungs, vision, nerves, or muscles  and in extremely rare instances death.  My doctor has explained to me other choices available to me for my care (alternatives).  Pregnant Patients (“epidural”):  I understand that the risks of having an epidural (medicine given into my back to help control pain during labor), include itching, low blood pressure, difficulty urinating, headache or slowing of the baby’s heart. Very rare risks include infection, bleeding, seizure, irregular heart rhythms and nerve injury.  Regional Anesthesia (“spinal”, “epidural”, & “nerve blocks”):  I understand that rare but potential complications include headache, bleeding, infection, seizure, irregular heart rhythms, and nerve injury.    _____________________________________________________________________________  Patient (or Representative) Signature/Relationship to Patient  Date   Time    _____________________________________________________________________________   Name (if used)    Language/Organization   Time    _____________________________________________________________________________  Nurse Anesthetist Signature     Date   Time  _____________________________________________________________________________  Anesthesiologist Signature     Date   Time  I have discussed the procedure and information above with the patient (or patient’s representative) and answered their questions. The patient or their representative has agreed to have anesthesia services.    _____________________________________________________________________________  Witness        Date   Time  I have verified that the signature is that of the patient or patient’s representative, and that it was signed before the procedure  Patient Name: Lavon Saucedo     : 1980                 Printed: 2024 at 7:14 AM    Medical Record #: [id number]                                            Page 1 of 1  ----------ANESTHESIA CONSENT----------

## (undated) NOTE — LETTER
12/8/2020          To Whom It May Concern:    Kalpana Luna is currently under my medical care and may not return to work at this time. Please excuse [place] Barbie for 1 week. He may return to work on 12/14/20. Activity is restricted as follows: none.     If yo

## (undated) NOTE — LETTER
[address]  [facility]  Dept: [phone]  Dept Fax: [phone]  Loc: [phone]      January 24, 2018    Patient: Divina Rodriguez   Date of Visit: 1/24/2018       To Whom It May Concern:    Onofre Jauregui

## (undated) NOTE — LETTER
[facility]  [address], [place], [place]  Authorization for Surgical Operation and Procedure                                                                                           I hereby authorize Yasir Mitchell MD, my physician and his/her assistants (if applicable), which may include medical students, residents, and/or fellows, to perform the following surgical operation/ procedure and administer such anesthesia as may be determined necessary by my physician: Operation/Procedure name (s) COLONOSCOPY/ ESOPHAGOGASTRODUODENOSCOPY on Lavon Saucedo   2.   I recognize that during the surgical operation/procedure, unforeseen conditions may necessitate additional or different procedures than those listed above.  I, therefore, further authorize and request that the above-named surgeon, assistants, or designees perform such procedures as are, in their judgment, necessary and desirable.    3.   My surgeon/physician has discussed prior to my surgery the potential benefits, risks and side effects of this procedure; the likelihood of achieving goals; and potential problems that might occur during recuperation.  They also discussed reasonable alternatives to the procedure, including risks, benefits, and side effects related to the alternatives and risks related to not receiving this procedure.  I have had all my questions answered and I acknowledge that no guarantee has been made as to the result that may be obtained.    4.   Should the need arise during my operation/procedure, which includes change of level of care prior to discharge, I also consent to the administration of blood and/or blood products.  Further, I understand that despite careful testing and screening of blood or blood products by collecting agencies, I may still be subject to ill effects as a result of receiving a blood transfusion and/or blood products.  The following are some, but not all, of the potential risks that can  occur: fever and allergic reactions, hemolytic reactions, transmission of diseases such as Hepatitis, AIDS and Cytomegalovirus (CMV) and fluid overload.  In the event that I wish to have an autologous transfusion of my own blood, or a directed donor transfusion, I will discuss this with my physician.  Check only if Refusing Blood or Blood Products  I understand refusal of blood or blood products as deemed necessary by my physician may have serious consequences to my condition to include possible death. I hereby assume responsibility for my refusal and release the hospital, its personnel, and my physicians from any responsibility for the consequences of my refusal.    o  Refuse   5.   I authorize the use of any specimen, organs, tissues, body parts or foreign objects that may be removed from my body during the operation/procedure for diagnosis, research or teaching purposes and their subsequent disposal by hospital authorities.  I also authorize the release of specimen test results and/or written reports to my treating physician on the hospital medical staff or other referring or consulting physicians involved in my care, at the discretion of the Pathologist or my treating physician.    6.   I consent to the photographing or videotaping of the operations or procedures to be performed, including appropriate portions of my body for medical, scientific, or educational purposes, provided my identity is not revealed by the pictures or by descriptive texts accompanying them.  If the procedure has been photographed/videotaped, the surgeon will obtain the original picture, image, videotape or CD.  The hospital will not be responsible for storage, release or maintenance of the picture, image, tape or CD.    7.   I consent to the presence of a  or observers in the operating room as deemed necessary by my physician or their designees.    8.   I recognize that in the event my procedure results in extended  X-Ray/fluoroscopy time, I may develop a skin reaction.    9. If I have a Do Not Attempt Resuscitation (DNAR) order in place, that status will be suspended while in the operating room, procedural suite, and during the recovery period unless otherwise explicitly stated by me (or a person authorized to consent on my behalf). The surgeon or my attending physician will determine when the applicable recovery period ends for purposes of reinstating the DNAR order.  10. Patients having a sterilization procedure: I understand that if the procedure is successful the results will be permanent and it will therefore be impossible for me to inseminate, conceive, or bear children.  I also understand that the procedure is intended to result in sterility, although the result has not been guaranteed.   11. I acknowledge that my physician has explained sedation/analgesia administration to me including the risk and benefits I consent to the administration of sedation/analgesia as may be necessary or desirable in the judgment of my physician.    I CERTIFY THAT I HAVE READ AND FULLY UNDERSTAND THE ABOVE CONSENT TO OPERATION and/or OTHER PROCEDURE.     _________________________________________ _________________________________     ___________________________________  Signature of Patient     Signature of Responsible Person                   Printed Name of Responsible Person                              _________________________________________ ______________________________        ___________________________________  Signature of Witness         Date  Time         Relationship to Patient    STATEMENT OF PHYSICIAN My signature below affirms that prior to the time of the procedure; I have explained to the patient and/or his/her legal representative, the risks and benefits involved in the proposed treatment and any reasonable alternative to the proposed treatment. I have also explained the risks and benefits involved in refusal of the  proposed treatment and alternatives to the proposed treatment and have answered the patient's questions. If I have a significant financial interest in a co-management agreement or a significant financial interest in any product or implant, or other significant relationship used in this procedure/surgery, I have disclosed this and had a discussion with my patient.     _______________________________________________________________ _____________________________  (Signature of Physician)                                                                                         (Date)                                   (Time)  Patient Name: Lavon Saucedo    : 1980   Printed: 2024      Medical Record #: [id number]                                              Page 1 of 1

## (undated) NOTE — LETTER
11/25/20202          To Whom It May Concern:    Tracy Alas is currently under my medical care and may not return to work at this time. Please excuse Angelica Diana for 2 weeks. He may return to work on 12/07/20. Activity is restricted as follows: none.     If

## (undated) NOTE — LETTER
October 12, 2017     Taj Contreras  [address] [place] [id number]      Dear Diana Martines:    Below are the results from your recent visit:  Results are within normal limits  Resulted Orders   CBC W/ DIFFERENTIAL   Result Value Ref Range    WBC 6.7 4.0 -